# Patient Record
Sex: FEMALE | Race: WHITE | NOT HISPANIC OR LATINO | Employment: FULL TIME | URBAN - METROPOLITAN AREA
[De-identification: names, ages, dates, MRNs, and addresses within clinical notes are randomized per-mention and may not be internally consistent; named-entity substitution may affect disease eponyms.]

---

## 2017-01-13 ENCOUNTER — OFFICE VISIT (OUTPATIENT)
Dept: FAMILY MEDICINE CLINIC | Facility: CLINIC | Age: 37
End: 2017-01-13

## 2017-01-13 VITALS
HEIGHT: 68 IN | BODY MASS INDEX: 32.86 KG/M2 | OXYGEN SATURATION: 99 % | SYSTOLIC BLOOD PRESSURE: 134 MMHG | TEMPERATURE: 98.6 F | HEART RATE: 74 BPM | WEIGHT: 216.8 LBS | DIASTOLIC BLOOD PRESSURE: 70 MMHG

## 2017-01-13 DIAGNOSIS — F41.9 ANXIETY: ICD-10-CM

## 2017-01-13 DIAGNOSIS — G43.009 NONINTRACTABLE MIGRAINE, UNSPECIFIED MIGRAINE TYPE: Primary | ICD-10-CM

## 2017-01-13 PROCEDURE — 99213 OFFICE O/P EST LOW 20 MIN: CPT | Performed by: FAMILY MEDICINE

## 2017-01-13 RX ORDER — HYDROCODONE BITARTRATE AND ACETAMINOPHEN 7.5; 325 MG/1; MG/1
TABLET ORAL
Qty: 180 TABLET | Refills: 0 | Status: SHIPPED | OUTPATIENT
Start: 2017-01-13 | End: 2017-02-21 | Stop reason: SDUPTHER

## 2017-01-13 NOTE — MR AVS SNAPSHOT
Martha Diaz   1/13/2017 2:00 PM   Office Visit    Dept Phone:  [phone]   Encounter #:  [id number]    Provider:  Chava Bridges MD   Department:  [facility] AND [facility] MED[facility]INE                Your Full Care Plan              Today's Medication Changes          These changes are accurate as of: 1/13/17  3:26 PM.  If you have any questions, ask your nurse or doctor.               New Medication(s)Ordered:     sertraline 50 MG tablet   Commonly known as:  ZOLOFT   Take 1 tablet by mouth Daily.   Started by:  Chava Bridges MD            Where to Get Your Medications      These medications were sent to [facility] [address] [place] [address] [phone] [facility] [phone] [address] [address]    Hours:  24-hours Phone:  [phone]     sertraline 50 MG tablet         You can get these medications from any pharmacy     Bring a paper prescription for each of these medications     HYDROcodone-acetaminophen 7.5-325 MG per tablet                  Your Updated Medication List          This list is accurate as of: 1/13/17  3:26 PM.  Always use your most recent med list.                HYDROcodone-acetaminophen 7.5-325 MG per tablet   Commonly known as:  NORCO   TAKE 1 TAB Q 4 TO 6 HRS       promethazine 12.5 MG tablet   Commonly known as:  PHENERGAN       saccharomyces boulardii 250 MG capsule   Commonly known as:  FLORASTOR       sertraline 50 MG tablet   Commonly known as:  ZOLOFT   Take 1 tablet by mouth Daily.               You Were Diagnosed With        Codes Comments    Nonintractable migraine, unspecified migraine type    -  Primary ICD-10-CM: G43.009  ICD-9-CM: 346.10     Anxiety     ICD-10-CM: F41.9  ICD-9-CM: 300.00       Instructions     None    Patient Instructions History      Upcoming Appointments     Visit Type Date Time Department    FOLLOW UP 1/13/2017  2:00 PM NAVA VASQUEZ  "ANUJ    FOLLOW UP 2017 11:45 AM NAVA LESLIE      Fanny[employer] Signup     [facility]Bridge allows you to send messages to your doctor, view your test results, renew your prescriptions, schedule appointments, and more. To sign up, go to [employer] and click on the Sign Up Now link in the New User? box. Enter your SkyBridge Activation Code exactly as it appears below along with the last four digits of your Social Security Number and your Date of Birth () to complete the sign-up process. If you do not sign up before the expiration date, you must request a new code.    SkyBridge Activation Code: [email or web address]  Expires: 2017  5:36 AM    If you have questions, you can email [email or web address] or call [phone] to talk to our SkyBridge staff. Remember, SkyBridge is NOT to be used for urgent needs. For medical emergencies, dial 911.               Other Info from Your Visit           Your Appointments     2017 11:45 AM EDT   Follow Up with Chava Bridges MD   [facility] (--)    [address] [place] [id number] [phone]           Arrive 15 minutes prior to appointment.              Allergies     No Known Allergies      Reason for Visit     Headache 3 mo f/u-waking up with HAs    Anxiety on no meds      Vital Signs     Blood Pressure Pulse Temperature Height    134/70 (BP Location: Left arm, Patient Position: Sitting, Cuff Size: Large Adult) 74 98.6 °F (37 °C) (Oral) 67.5\" (171.5 cm)    Weight Oxygen Saturation Body Mass Index Smoking Status    216 lb 12.8 oz (98.3 kg) 99% 33.45 kg/m2 Current Some Day Smoker      Problems and Diagnoses Noted     Anxiety problem    Nonintractable migraine        "

## 2017-01-13 NOTE — PROGRESS NOTES
Subjective: Martha Diaz is a 36 y.o. female presents   here today for       Chief Complaint and HPI    I have reviewed the patient's medical history in detail and updated the computerized patient record.    Headache (3 mo f/u-waking up with HAs) and Anxiety (on no meds)      Review of Systems   Constitutional: Negative.    Eyes: Negative.    Respiratory: Negative.    Cardiovascular: Negative.    Gastrointestinal: Negative.    Endocrine: Negative.    Genitourinary: Negative.    Musculoskeletal: Negative.    Skin: Negative.    Allergic/Immunologic: Negative.    Neurological: Positive for headaches.   Hematological: Negative.    Psychiatric/Behavioral: Negative.        Physical Exam   Constitutional: She appears well-developed and well-nourished.   Cardiovascular: Normal rate, regular rhythm, normal heart sounds and intact distal pulses.    Pulmonary/Chest: Effort normal and breath sounds normal.   Vitals reviewed.      Procedures    Assessment:   Diagnosis Plan   1. Nonintractable migraine, unspecified migraine type     2. Anxiety         Plan:  No orders of the defined types were placed in this encounter.      Requested Prescriptions     Signed Prescriptions Disp Refills   • HYDROcodone-acetaminophen (NORCO) 7.5-325 MG per tablet 180 tablet 0     Sig: TAKE 1 TAB Q 4 TO 6 HRS   • sertraline (ZOLOFT) 50 MG tablet 30 tablet 3     Sig: Take 1 tablet by mouth Daily.       All tests and consults since last visit reviewed with patient    Return in about 3 months (around 4/13/2017) for Recheck.

## 2017-02-21 RX ORDER — HYDROCODONE BITARTRATE AND ACETAMINOPHEN 7.5; 325 MG/1; MG/1
TABLET ORAL
Qty: 180 TABLET | Refills: 0 | Status: SHIPPED | OUTPATIENT
Start: 2017-02-21 | End: 2017-03-21 | Stop reason: SDUPTHER

## 2017-03-21 RX ORDER — HYDROCODONE BITARTRATE AND ACETAMINOPHEN 7.5; 325 MG/1; MG/1
TABLET ORAL
Qty: 180 TABLET | Refills: 0 | Status: SHIPPED | OUTPATIENT
Start: 2017-03-21 | End: 2017-04-17 | Stop reason: SDUPTHER

## 2017-04-17 ENCOUNTER — OFFICE VISIT (OUTPATIENT)
Dept: FAMILY MEDICINE CLINIC | Facility: CLINIC | Age: 37
End: 2017-04-17

## 2017-04-17 VITALS
HEART RATE: 90 BPM | HEIGHT: 68 IN | WEIGHT: 219.4 LBS | DIASTOLIC BLOOD PRESSURE: 78 MMHG | OXYGEN SATURATION: 99 % | SYSTOLIC BLOOD PRESSURE: 118 MMHG | TEMPERATURE: 98.3 F | BODY MASS INDEX: 33.25 KG/M2

## 2017-04-17 DIAGNOSIS — F41.9 ANXIETY: ICD-10-CM

## 2017-04-17 DIAGNOSIS — G43.009 NONINTRACTABLE MIGRAINE, UNSPECIFIED MIGRAINE TYPE: Primary | ICD-10-CM

## 2017-04-17 DIAGNOSIS — L70.0 ACNE VULGARIS: ICD-10-CM

## 2017-04-17 PROCEDURE — 99213 OFFICE O/P EST LOW 20 MIN: CPT | Performed by: FAMILY MEDICINE

## 2017-04-17 RX ORDER — CLINDAMYCIN PHOSPHATE 11.9 MG/ML
SOLUTION TOPICAL 2 TIMES DAILY
Qty: 60 ML | Refills: 5 | Status: SHIPPED | OUTPATIENT
Start: 2017-04-17 | End: 2018-12-06

## 2017-04-17 RX ORDER — HYDROCODONE BITARTRATE AND ACETAMINOPHEN 7.5; 325 MG/1; MG/1
TABLET ORAL
Qty: 180 TABLET | Refills: 0 | Status: SHIPPED | OUTPATIENT
Start: 2017-04-17 | End: 2017-05-17 | Stop reason: SDUPTHER

## 2017-04-17 NOTE — PROGRESS NOTES
Chief Complaint and HPI    I have reviewed the patient's medical history in detail and updated the computerized patient record.    Subjective: Martha Diaz is a 36 y.o. female presents   here today for .    Headache (Takes prn norco); Acne (needs cleocin T); and Anxiety (not yet started  Zoloft)      Review of Systems   Constitutional: Negative for chills, fatigue, fever and unexpected weight change.   HENT: Negative for ear pain, hearing loss, sinus pressure, sore throat and tinnitus.    Eyes: Negative for pain, discharge and redness.   Respiratory: Negative for cough, shortness of breath and wheezing.    Cardiovascular: Negative for chest pain, palpitations and leg swelling.   Gastrointestinal: Negative for abdominal pain, constipation, diarrhea and nausea.   Endocrine: Negative for cold intolerance and heat intolerance.   Genitourinary: Negative for difficulty urinating, flank pain and urgency.   Musculoskeletal: Negative for back pain, joint swelling and myalgias.   Skin: Negative for rash and wound.   Allergic/Immunologic: Negative for environmental allergies and food allergies.   Neurological: Negative for dizziness, seizures, numbness and headaches.   Hematological: Negative for adenopathy. Does not bruise/bleed easily.   Psychiatric/Behavioral: Negative for decreased concentration, dysphoric mood and sleep disturbance. The patient is not nervous/anxious.    All other systems reviewed and are negative.      Physical Exam   Constitutional: She appears well-developed and well-nourished.   Cardiovascular: Normal rate, regular rhythm, normal heart sounds and intact distal pulses.    Pulmonary/Chest: Effort normal and breath sounds normal.   Neurological: She is alert. She has normal reflexes.   Skin:   nwck with acne   Vitals reviewed.      Procedures    Assessment:   Diagnosis Plan   1. Nonintractable migraine, unspecified migraine type     2. Anxiety     3. Acne vulgaris         Plan:  No orders of the  defined types were placed in this encounter.      Requested Prescriptions     Signed Prescriptions Disp Refills   • sertraline (ZOLOFT) 50 MG tablet 30 tablet 3     Sig: Take 1 tablet by mouth Daily.   • clindamycin (CLEOCIN-T) 1 % external solution 60 mL 5     Sig: Apply  topically 2 (Two) Times a Day.   • HYDROcodone-acetaminophen (NORCO) 7.5-325 MG per tablet 180 tablet 0     Sig: TAKE 1 TAB Q 4 TO 6 HRS       All tests and consults since last visit reviewed with patient    Return in about 3 months (around 7/17/2017) for Recheck.

## 2017-05-17 RX ORDER — HYDROCODONE BITARTRATE AND ACETAMINOPHEN 7.5; 325 MG/1; MG/1
TABLET ORAL
Qty: 180 TABLET | Refills: 0 | Status: SHIPPED | OUTPATIENT
Start: 2017-05-17 | End: 2017-06-13 | Stop reason: SDUPTHER

## 2017-06-13 RX ORDER — HYDROCODONE BITARTRATE AND ACETAMINOPHEN 7.5; 325 MG/1; MG/1
TABLET ORAL
Qty: 180 TABLET | Refills: 0 | Status: SHIPPED | OUTPATIENT
Start: 2017-06-13 | End: 2017-07-11 | Stop reason: SDUPTHER

## 2017-07-11 ENCOUNTER — TELEPHONE (OUTPATIENT)
Dept: FAMILY MEDICINE CLINIC | Facility: CLINIC | Age: 37
End: 2017-07-11

## 2017-07-11 RX ORDER — HYDROCODONE BITARTRATE AND ACETAMINOPHEN 7.5; 325 MG/1; MG/1
TABLET ORAL
Qty: 180 TABLET | Refills: 0 | Status: SHIPPED | OUTPATIENT
Start: 2017-07-11 | End: 2017-08-10 | Stop reason: SDUPTHER

## 2017-08-10 RX ORDER — HYDROCODONE BITARTRATE AND ACETAMINOPHEN 7.5; 325 MG/1; MG/1
TABLET ORAL
Qty: 90 TABLET | Refills: 0 | Status: SHIPPED | OUTPATIENT
Start: 2017-08-10 | End: 2017-08-23 | Stop reason: SDUPTHER

## 2017-08-10 NOTE — TELEPHONE ENCOUNTER
----- Message from Lucy Stafford sent at 8/10/2017  3:41 PM EDT -----  [phone]    REFILL ON [place]     PT AWARE THAT SHE WILL GET A CALL WHEN ITS READY AND THAT THE DR IS OUT TILL Tuesday

## 2017-08-10 NOTE — TELEPHONE ENCOUNTER
----- Message from Lucy Stafford sent at 8/10/2017  3:41 PM EDT -----  [phone]    REFILL ON NORCO     PT AWARE THAT SHE WILL GET A CALL WHEN ITS READY AND THAT THE DR IS OUT TILL Tuesday   Last office visit 4/17/17  Patient canceled 7/21/17 appt  Last fill 7/11/17  Last cara 4/17/17    Per Management two week med put up and patient called left message she must be seen in office.

## 2017-08-23 ENCOUNTER — OFFICE VISIT (OUTPATIENT)
Dept: FAMILY MEDICINE CLINIC | Facility: CLINIC | Age: 37
End: 2017-08-23

## 2017-08-23 VITALS
HEIGHT: 68 IN | OXYGEN SATURATION: 100 % | SYSTOLIC BLOOD PRESSURE: 120 MMHG | BODY MASS INDEX: 33.65 KG/M2 | DIASTOLIC BLOOD PRESSURE: 90 MMHG | TEMPERATURE: 98.7 F | WEIGHT: 222 LBS | HEART RATE: 67 BPM

## 2017-08-23 DIAGNOSIS — G43.009 NONINTRACTABLE MIGRAINE, UNSPECIFIED MIGRAINE TYPE: Primary | ICD-10-CM

## 2017-08-23 DIAGNOSIS — Z79.899 ENCOUNTER FOR LONG-TERM (CURRENT) USE OF MEDICATIONS: ICD-10-CM

## 2017-08-23 DIAGNOSIS — F41.9 ANXIETY: ICD-10-CM

## 2017-08-23 LAB
POC AMPHETAMINES: NEGATIVE
POC BARBITURATES: NEGATIVE
POC BENZODIAZEPHINES: NEGATIVE
POC COCAINE: NEGATIVE
POC METHADONE: NEGATIVE
POC METHAMPHETAMINE SCREEN URINE: NEGATIVE
POC OPIATES: NEGATIVE
POC OXYCODONE: POSITIVE
POC PHENCYCLIDINE: NEGATIVE
POC PROPOXYPHENE: NEGATIVE
POC THC: NEGATIVE
POC TRICYCLIC ANTIDEPRESSANTS: NEGATIVE

## 2017-08-23 PROCEDURE — 80305 DRUG TEST PRSMV DIR OPT OBS: CPT | Performed by: NURSE PRACTITIONER

## 2017-08-23 PROCEDURE — 99213 OFFICE O/P EST LOW 20 MIN: CPT | Performed by: NURSE PRACTITIONER

## 2017-08-23 RX ORDER — FLUOXETINE HYDROCHLORIDE 20 MG/1
20 CAPSULE ORAL DAILY
Qty: 30 CAPSULE | Refills: 3 | Status: SHIPPED | OUTPATIENT
Start: 2017-08-23 | End: 2018-12-06

## 2017-08-23 RX ORDER — HYDROCODONE BITARTRATE AND ACETAMINOPHEN 7.5; 325 MG/1; MG/1
TABLET ORAL
Qty: 180 TABLET | Refills: 0 | Status: SHIPPED | OUTPATIENT
Start: 2017-08-23 | End: 2017-09-21 | Stop reason: SDUPTHER

## 2017-08-23 RX ORDER — FLUOXETINE 10 MG/1
CAPSULE ORAL
Qty: 60 CAPSULE | Refills: 0 | Status: SHIPPED | OUTPATIENT
Start: 2017-08-23 | End: 2018-12-06

## 2017-08-23 NOTE — PROGRESS NOTES
Subjective   Martha Diaz is a 36 y.o. female presents for medication refill for Norco. Takes for migraines. Also started Zoloft for increased anxiety and depression. Unable to tolerate the side effects and no longer taking. Would like to discuss alternatives to zoloft to help reduce her symptoms.     Headache    This is a chronic problem. The current episode started more than 1 year ago. The problem occurs intermittently. The problem has been waxing and waning. The pain is at a severity of 6/10. Pertinent negatives include no abdominal pain, abnormal behavior, anorexia, back pain, blurred vision, coughing, dizziness, drainage, ear pain, eye pain, eye redness, eye watering, facial sweating, fever, hearing loss, insomnia, loss of balance, muscle aches, nausea, neck pain, numbness, phonophobia, photophobia, rhinorrhea, scalp tenderness, seizures, sinus pressure, sore throat, swollen glands, tingling, tinnitus, visual change, vomiting, weakness or weight loss. Nothing aggravates the symptoms.        The following portions of the patient's history were reviewed and updated as appropriate: allergies, current medications, past family history, past medical history, past social history, past surgical history and problem list.    Review of Systems   Constitutional: Negative.  Negative for fever and weight loss.   HENT: Negative for ear pain, hearing loss, rhinorrhea, sinus pressure, sore throat and tinnitus.    Eyes: Negative.  Negative for blurred vision, photophobia, pain and redness.   Respiratory: Negative.  Negative for cough.    Cardiovascular: Negative.    Gastrointestinal: Negative.  Negative for abdominal pain, anorexia, nausea and vomiting.   Endocrine: Negative.    Genitourinary: Negative.    Musculoskeletal: Negative.  Negative for back pain and neck pain.   Skin: Negative.    Allergic/Immunologic: Negative.    Neurological: Positive for headaches. Negative for dizziness, tingling, seizures, weakness, numbness  and loss of balance.   Hematological: Negative.    Psychiatric/Behavioral: Negative.  The patient does not have insomnia.        Objective   Physical Exam   Constitutional: She appears well-developed and well-nourished.   HENT:   Head: Normocephalic.   Eyes: EOM are normal. Pupils are equal, round, and reactive to light.   Neck: Neck supple.   Cardiovascular: Normal rate, regular rhythm and normal heart sounds.  Exam reveals no gallop and no friction rub.    No murmur heard.  Pulmonary/Chest: Effort normal and breath sounds normal. No respiratory distress. She has no wheezes. She has no rales.   Skin: Skin is warm and dry.   Psychiatric: She has a normal mood and affect.   Vitals reviewed.      Assessment/Plan   Martha was seen today for follow-up.    Diagnoses and all orders for this visit:    Nonintractable migraine, unspecified migraine type    Anxiety    Encounter for long-term (current) use of medications  -     POC Urine Drug Screen, Triage    Other orders  -     FLUoxetine (PROZAC) 10 MG capsule; Take one capsule once daily x 7 days, then increase to two capsules daily  -     FLUoxetine (PROZAC) 20 MG capsule; Take 1 capsule by mouth Daily.

## 2017-08-23 NOTE — PATIENT INSTRUCTIONS
Acetaminophen; Hydrocodone tablets or capsules  What is this medicine?  ACETAMINOPHEN; HYDROCODONE (a set a TONO javier fen; lynn droe KOE done) is a pain reliever. It is used to treat moderate to severe pain.  This medicine may be used for other purposes; ask your health care provider or pharmacist if you have questions.  COMMON BRAND NAME(S): Anexsia, Bancap HC, Ceta-Plus, Co-Gesic, Comfortpak, Dolagesic, Dolorex Forte, DuoCet, Hydrocet, Hydrogesic, Lorcet, Lorcet HD, Lorcet Plus, Lortab, Margesic H, Maxidone, Norco, Polygesic, Stagesic, Vanacet, Verdrocet, Vicodin, Vicodin ES, Vicodin HP, Xodol, Zydone  What should I tell my health care provider before I take this medicine?  They need to know if you have any of these conditions:  -brain tumor  -Crohn's disease, inflammatory bowel disease, or ulcerative colitis  -drug abuse or addiction  -head injury  -heart or circulation problems  -if you often drink alcohol  -kidney disease or problems going to the bathroom  -liver disease  -lung disease, asthma, or breathing problems  -an unusual or allergic reaction to acetaminophen, hydrocodone, other opioid analgesics, other medicines, foods, dyes, or preservatives  -pregnant or trying to get pregnant  -breast-feeding  How should I use this medicine?  Take this medicine by mouth with a glass of water. Follow the directions on the prescription label. You can take it with or without food. If it upsets your stomach, take it with food. Do not take your medicine more often than directed.  A special MedGuide will be given to you by the pharmacist with each prescription and refill. Be sure to read this information carefully each time.  Talk to your pediatrician regarding the use of this medicine in children. Special care may be needed.  Overdosage: If you think you have taken too much of this medicine contact a poison control center or emergency room at once.  NOTE: This medicine is only for you. Do not share this medicine with  others.  What if I miss a dose?  If you miss a dose, take it as soon as you can. If it is almost time for your next dose, take only that dose. Do not take double or extra doses.  What may interact with this medicine?  This medicine may interact with the following medications:  -alcohol  -antiviral medicines for HIV or AIDS  -atropine  -antihistamines for allergy, cough and cold  -certain antibiotics like erythromycin, clarithromycin  -certain medicines for anxiety or sleep  -certain medicines for bladder problems like oxybutynin, tolterodine  -certain medicines for depression like amitriptyline, fluoxetine, sertraline  -certain medicines for fungal infections like ketoconazole and itraconazole  -certain medicines for Parkinson's disease like benztropine, trihexyphenidyl  -certain medicines for seizures like carbamazepine, phenobarbital, phenytoin, primidone  -certain medicines for stomach problems like dicyclomine, hyoscyamine  -certain medicines for travel sickness like scopolamine  -general anesthetics like halothane, isoflurane, methoxyflurane, propofol  -ipratropium  -local anesthetics like lidocaine, pramoxine, tetracaine  -MAOIs like Carbex, Eldepryl, Marplan, Nardil, and Parnate  -medicines that relax muscles for surgery  -other medicines with acetaminophen  -other narcotic medicines for pain or cough  -phenothiazines like chlorpromazine, mesoridazine, prochlorperazine, thioridazine  -rifampin  This list may not describe all possible interactions. Give your health care provider a list of all the medicines, herbs, non-prescription drugs, or dietary supplements you use. Also tell them if you smoke, drink alcohol, or use illegal drugs. Some items may interact with your medicine.  What should I watch for while using this medicine?  Tell your doctor or health care professional if your pain does not go away, if it gets worse, or if you have new or a different type of pain. You may develop tolerance to the medicine.  Tolerance means that you will need a higher dose of the medicine for pain relief. Tolerance is normal and is expected if you take the medicine for a long time.  Do not suddenly stop taking your medicine because you may develop a severe reaction. Your body becomes used to the medicine. This does NOT mean you are addicted. Addiction is a behavior related to getting and using a drug for a non-medical reason. If you have pain, you have a medical reason to take pain medicine. Your doctor will tell you how much medicine to take. If your doctor wants you to stop the medicine, the dose will be slowly lowered over time to avoid any side effects.  There are different types of narcotic medicines (opiates). If you take more than one type at the same time or if you are taking another medicine that also causes drowsiness, you may have more side effects. Give your health care provider a list of all medicines you use. Your doctor will tell you how much medicine to take. Do not take more medicine than directed. Call emergency for help if you have problems breathing or unusual sleepiness.  Do not take other medicines that contain acetaminophen with this medicine. Always read labels carefully. If you have questions, ask your doctor or pharmacist.  If you take too much acetaminophen get medical help right away. Too much acetaminophen can be very dangerous and cause liver damage. Even if you do not have symptoms, it is important to get help right away.  You may get drowsy or dizzy. Do not drive, use machinery, or do anything that needs mental alertness until you know how this medicine affects you. Do not stand or sit up quickly, especially if you are an older patient. This reduces the risk of dizzy or fainting spells. Alcohol may interfere with the effect of this medicine. Avoid alcoholic drinks.  The medicine will cause constipation. Try to have a bowel movement at least every 2 to 3 days. If you do not have a bowel movement for 3  days, call your doctor or health care professional.  Your mouth may get dry. Chewing sugarless gum or sucking hard candy, and drinking plenty of water may help. Contact your doctor if the problem does not go away or is severe.  What side effects may I notice from receiving this medicine?  Side effects that you should report to your doctor or health care professional as soon as possible:  -allergic reactions like skin rash, itching or hives, swelling of the face, lips, or tongue  -breathing problems  -confusion  -redness, blistering, peeling or loosening of the skin, including inside the mouth  -signs and symptoms of low blood pressure like dizziness; feeling faint or lightheaded, falls; unusually weak or tired  -trouble passing urine or change in the amount of urine  -yellowing of the eyes or skin  Side effects that usually do not require medical attention (report to your doctor or health care professional if they continue or are bothersome):  -constipation  -dry mouth  -nausea, vomiting  -tiredness  This list may not describe all possible side effects. Call your doctor for medical advice about side effects. You may report side effects to FDA at 9-988-FDA-6377.  Where should I keep my medicine?  Keep out of the reach of children. This medicine can be abused. Keep your medicine in a safe place to protect it from theft. Do not share this medicine with anyone. Selling or giving away this medicine is dangerous and against the law.  This medicine may cause accidental overdose and death if it taken by other adults, children, or pets. Mix any unused medicine with a substance like cat litter or coffee grounds. Then throw the medicine away in a sealed container like a sealed bag or a coffee can with a lid. Do not use the medicine after the expiration date.  Store at room temperature between 15 and 30 degrees C (59 and 86 degrees F).  NOTE: This sheet is a summary. It may not cover all possible information. If you have  questions about this medicine, talk to your doctor, pharmacist, or health care provider.     © 2017, [employer]. (2016-09-09 10:02:16)

## 2017-09-21 NOTE — TELEPHONE ENCOUNTER
----- Message from Torres Chacko sent at 9/21/2017 10:39 AM EDT -----  [phone]    PT WOULD LIKE A REFILL ON HER SCRIPT:   - HYDROcodone-acetaminophen (NORCO) 7.5-325 MG per tablet     LSD: 8.23.17    PT IS AWARE SHE WILL GET A CALL WHEN SCRIPT IS READY.   THANK YOU     Last fill 8/23/17  Gui marroquin 8/23/17  No scheduled appt

## 2017-09-25 RX ORDER — HYDROCODONE BITARTRATE AND ACETAMINOPHEN 7.5; 325 MG/1; MG/1
TABLET ORAL
Qty: 180 TABLET | Refills: 0 | Status: SHIPPED | OUTPATIENT
Start: 2017-09-25 | End: 2017-10-23 | Stop reason: SDUPTHER

## 2017-10-23 RX ORDER — HYDROCODONE BITARTRATE AND ACETAMINOPHEN 7.5; 325 MG/1; MG/1
TABLET ORAL
Qty: 180 TABLET | Refills: 0 | Status: SHIPPED | OUTPATIENT
Start: 2017-10-23 | End: 2017-11-10 | Stop reason: SDUPTHER

## 2017-11-10 RX ORDER — HYDROCODONE BITARTRATE AND ACETAMINOPHEN 7.5; 325 MG/1; MG/1
TABLET ORAL
Qty: 180 TABLET | Refills: 0 | Status: SHIPPED | OUTPATIENT
Start: 2017-11-10 | End: 2017-12-21 | Stop reason: SDUPTHER

## 2017-12-26 RX ORDER — HYDROCODONE BITARTRATE AND ACETAMINOPHEN 7.5; 325 MG/1; MG/1
TABLET ORAL
Qty: 180 TABLET | Refills: 0 | Status: SHIPPED | OUTPATIENT
Start: 2017-12-26 | End: 2018-01-22 | Stop reason: SDUPTHER

## 2018-01-23 RX ORDER — HYDROCODONE BITARTRATE AND ACETAMINOPHEN 7.5; 325 MG/1; MG/1
TABLET ORAL
Qty: 180 TABLET | Refills: 0 | Status: SHIPPED | OUTPATIENT
Start: 2018-01-23 | End: 2018-02-20 | Stop reason: SDUPTHER

## 2018-02-20 ENCOUNTER — OFFICE VISIT (OUTPATIENT)
Dept: INTERNAL MEDICINE | Facility: CLINIC | Age: 38
End: 2018-02-20

## 2018-02-20 VITALS
DIASTOLIC BLOOD PRESSURE: 88 MMHG | HEART RATE: 73 BPM | SYSTOLIC BLOOD PRESSURE: 118 MMHG | RESPIRATION RATE: 16 BRPM | BODY MASS INDEX: 34.53 KG/M2 | OXYGEN SATURATION: 98 % | HEIGHT: 68 IN | TEMPERATURE: 98.2 F | WEIGHT: 227.8 LBS

## 2018-02-20 DIAGNOSIS — R35.0 FREQUENT URINATION: Primary | ICD-10-CM

## 2018-02-20 DIAGNOSIS — L70.0 ACNE VULGARIS: ICD-10-CM

## 2018-02-20 DIAGNOSIS — G44.039 EPISODIC PAROXYSMAL HEMICRANIA, NOT INTRACTABLE: ICD-10-CM

## 2018-02-20 PROBLEM — N30.00 ACUTE CYSTITIS: Status: ACTIVE | Noted: 2018-02-20

## 2018-02-20 LAB
BILIRUB BLD-MCNC: NEGATIVE MG/DL
CLARITY, POC: CLEAR
COLOR UR: YELLOW
GLUCOSE UR STRIP-MCNC: NEGATIVE MG/DL
KETONES UR QL: NEGATIVE
LEUKOCYTE EST, POC: NEGATIVE
NITRITE UR-MCNC: NEGATIVE MG/ML
PH UR: 5 [PH] (ref 5–8)
PROT UR STRIP-MCNC: NEGATIVE MG/DL
RBC # UR STRIP: NEGATIVE /UL
SP GR UR: 1.01 (ref 1–1.03)
UROBILINOGEN UR QL: NORMAL

## 2018-02-20 PROCEDURE — 99213 OFFICE O/P EST LOW 20 MIN: CPT | Performed by: FAMILY MEDICINE

## 2018-02-20 RX ORDER — PROPRANOLOL HCL 60 MG
60 CAPSULE, EXTENDED RELEASE 24HR ORAL DAILY
Qty: 30 CAPSULE | Refills: 5 | Status: SHIPPED | OUTPATIENT
Start: 2018-02-20 | End: 2018-10-08 | Stop reason: SDUPTHER

## 2018-02-20 RX ORDER — DOXYCYCLINE HYCLATE 100 MG/1
100 CAPSULE ORAL DAILY
Qty: 30 CAPSULE | Refills: 5 | Status: SHIPPED | OUTPATIENT
Start: 2018-02-20 | End: 2018-04-03

## 2018-02-20 RX ORDER — HYDROCODONE BITARTRATE AND ACETAMINOPHEN 7.5; 325 MG/1; MG/1
TABLET ORAL
Qty: 180 TABLET | Refills: 0 | Status: SHIPPED | OUTPATIENT
Start: 2018-02-20 | End: 2018-03-19 | Stop reason: SDUPTHER

## 2018-02-20 RX ORDER — SULFAMETHOXAZOLE AND TRIMETHOPRIM 800; 160 MG/1; MG/1
1 TABLET ORAL 2 TIMES DAILY
Qty: 20 TABLET | Refills: 0 | Status: SHIPPED | OUTPATIENT
Start: 2018-02-20 | End: 2018-12-06

## 2018-02-20 NOTE — PROGRESS NOTES
"CC:UTI sxs, Migraines    Subjective.../HPI  Patient present today with1) migraines has HA daily, worse with ndecresed exercise2) acne  3)mood -off Prozac  4) UTI burning  I have reviewed the patient's medical history in detail and updated the computerized patient record.        Family History   Problem Relation Age of Onset   • Breast cancer Maternal Aunt    • Stomach cancer Maternal Aunt    • Hypertension Maternal Grandmother    • Thyroid disease Maternal Grandmother    • Hypertension Maternal Grandfather    • Thyroid disease Maternal Grandfather        Social History     Social History   • Marital status: Single     Spouse name: N/A   • Number of children: N/A   • Years of education: N/A     Occupational History   • Not on file.     Social History Main Topics   • Smoking status: Current Some Day Smoker     Packs/day: 1.00     Years: 17.00     Types: Cigarettes   • Smokeless tobacco: Never Used   • Alcohol use Yes      Comment: social   • Drug use: No   • Sexual activity: Defer     Other Topics Concern   • Not on file     Social History Narrative   • No narrative on file       Review of Systems:   Review of Systems   Constitutional: Negative.    HENT: Negative.    Eyes: Negative.    Respiratory: Negative.    Cardiovascular: Negative.    Gastrointestinal: Negative.    Endocrine: Negative.    Genitourinary: Negative.    Musculoskeletal: Negative.    Skin: Negative.    Allergic/Immunologic: Negative.    Neurological: Negative.    Hematological: Negative.    Psychiatric/Behavioral: Negative.          Physical Exam   Constitutional: She appears well-developed and well-nourished.   Cardiovascular: Normal rate, regular rhythm, normal heart sounds and intact distal pulses.    Pulmonary/Chest: Effort normal and breath sounds normal.   Vitals reviewed.        Vital Signs     Vitals:    02/20/18 1615   Weight: 103 kg (227 lb 12.8 oz)   Height: 171.5 cm (67.5\")          Results Review:      REVIEWED AND DISCUSSED CLINICAL " RESULTS WITH PATIENT      Requested Prescriptions     Signed Prescriptions Disp Refills   • doxycycline (VIBRAMYCIN) 100 MG capsule 30 capsule 5     Sig: Take 1 capsule by mouth Daily.   • propranolol LA (INDERAL LA) 60 MG 24 hr capsule 30 capsule 5     Sig: Take 1 capsule by mouth Daily.   • sulfamethoxazole-trimethoprim (BACTRIM DS) 800-160 MG per tablet 20 tablet 0     Sig: Take 1 tablet by mouth 2 (Two) Times a Day.   • HYDROcodone-acetaminophen (NORCO) 7.5-325 MG per tablet 180 tablet 0     Sig: TAKE 1 TAB Q 4 TO 6 HRS         Current Outpatient Prescriptions:   •  clindamycin (CLEOCIN-T) 1 % external solution, Apply  topically 2 (Two) Times a Day., Disp: 60 mL, Rfl: 5  •  doxycycline (VIBRAMYCIN) 100 MG capsule, Take 1 capsule by mouth Daily., Disp: 30 capsule, Rfl: 5  •  FLUoxetine (PROZAC) 10 MG capsule, Take one capsule once daily x 7 days, then increase to two capsules daily, Disp: 60 capsule, Rfl: 0  •  FLUoxetine (PROZAC) 20 MG capsule, Take 1 capsule by mouth Daily., Disp: 30 capsule, Rfl: 3  •  HYDROcodone-acetaminophen (NORCO) 7.5-325 MG per tablet, TAKE 1 TAB Q 4 TO 6 HRS, Disp: 180 tablet, Rfl: 0  •  propranolol LA (INDERAL LA) 60 MG 24 hr capsule, Take 1 capsule by mouth Daily., Disp: 30 capsule, Rfl: 5  •  sulfamethoxazole-trimethoprim (BACTRIM DS) 800-160 MG per tablet, Take 1 tablet by mouth 2 (Two) Times a Day., Disp: 20 tablet, Rfl: 0    Procedures    Assessment/Plan     Diagnoses and all orders for this visit:    Frequent urination  -     POC Urinalysis Dipstick, Automated  -     sulfamethoxazole-trimethoprim (BACTRIM DS) 800-160 MG per tablet; Take 1 tablet by mouth 2 (Two) Times a Day.  -     Urine Culture - Urine, Urine, Clean Catch    Episodic paroxysmal hemicrania, not intractable  -     propranolol LA (INDERAL LA) 60 MG 24 hr capsule; Take 1 capsule by mouth Daily.  -     HYDROcodone-acetaminophen (NORCO) 7.5-325 MG per tablet; TAKE 1 TAB Q 4 TO 6 HRS    Acne vulgaris  -     doxycycline  (VIBRAMYCIN) 100 MG capsule; Take 1 capsule by mouth Daily.  -     Ambulatory Referral to Dermatology         Return in about 2 months (around 4/20/2018) for Recheck.    Chava Bridges M.D  02/20/18  4:56 PM

## 2018-02-22 LAB
BACTERIA UR CULT: ABNORMAL
BACTERIA UR CULT: ABNORMAL

## 2018-03-19 DIAGNOSIS — G44.039 EPISODIC PAROXYSMAL HEMICRANIA, NOT INTRACTABLE: ICD-10-CM

## 2018-03-20 RX ORDER — HYDROCODONE BITARTRATE AND ACETAMINOPHEN 7.5; 325 MG/1; MG/1
TABLET ORAL
Qty: 180 TABLET | Refills: 0 | Status: SHIPPED | OUTPATIENT
Start: 2018-03-20 | End: 2018-04-03 | Stop reason: SDUPTHER

## 2018-04-03 ENCOUNTER — OFFICE VISIT (OUTPATIENT)
Dept: INTERNAL MEDICINE | Facility: CLINIC | Age: 38
End: 2018-04-03

## 2018-04-03 VITALS
HEIGHT: 68 IN | OXYGEN SATURATION: 99 % | WEIGHT: 228.4 LBS | SYSTOLIC BLOOD PRESSURE: 119 MMHG | HEART RATE: 73 BPM | DIASTOLIC BLOOD PRESSURE: 80 MMHG | BODY MASS INDEX: 34.62 KG/M2 | TEMPERATURE: 98.6 F

## 2018-04-03 DIAGNOSIS — N30.00 ACUTE CYSTITIS WITHOUT HEMATURIA: ICD-10-CM

## 2018-04-03 DIAGNOSIS — G44.039 EPISODIC PAROXYSMAL HEMICRANIA, NOT INTRACTABLE: ICD-10-CM

## 2018-04-03 DIAGNOSIS — Z71.6 TOBACCO ABUSE COUNSELING: ICD-10-CM

## 2018-04-03 DIAGNOSIS — F41.9 ANXIETY: Primary | ICD-10-CM

## 2018-04-03 PROCEDURE — 99213 OFFICE O/P EST LOW 20 MIN: CPT | Performed by: FAMILY MEDICINE

## 2018-04-03 RX ORDER — NICOTINE 21 MG/24HR
1 PATCH, TRANSDERMAL 24 HOURS TRANSDERMAL EVERY 24 HOURS
Qty: 14 PATCH | Refills: 0 | Status: SHIPPED | OUTPATIENT
Start: 2018-04-03 | End: 2018-07-12 | Stop reason: SDUPTHER

## 2018-04-03 RX ORDER — HYDROCODONE BITARTRATE AND ACETAMINOPHEN 7.5; 325 MG/1; MG/1
TABLET ORAL
Qty: 180 TABLET | Refills: 0 | Status: SHIPPED | OUTPATIENT
Start: 2018-04-03 | End: 2018-05-15 | Stop reason: SDUPTHER

## 2018-04-03 NOTE — PROGRESS NOTES
CC:tobacco abuse 2) depression 3) uti    Subjective.../HPI  Patient present today with    I have reviewed the patient's medical history in detail and updated the computerized patient record.    Past Medical History:   Diagnosis Date   • Anxiety    • Bump     on the wrist   • Ganglion cyst of wrist     R wrist   • Headache    • Migraine    • Right wrist pain    • Stress        Past Surgical History:   Procedure Laterality Date   •  SECTION     • CHOLECYSTECTOMY         Family History   Problem Relation Age of Onset   • Breast cancer Maternal Aunt    • Stomach cancer Maternal Aunt    • Hypertension Maternal Grandmother    • Thyroid disease Maternal Grandmother    • Hypertension Maternal Grandfather    • Thyroid disease Maternal Grandfather        Social History     Social History   • Marital status: Single     Spouse name: N/A   • Number of children: N/A   • Years of education: N/A     Occupational History   • Not on file.     Social History Main Topics   • Smoking status: Current Some Day Smoker     Packs/day: 1.00     Years: 17.00     Types: Cigarettes   • Smokeless tobacco: Never Used   • Alcohol use Yes      Comment: social   • Drug use: No   • Sexual activity: Defer     Other Topics Concern   • Not on file     Social History Narrative   • No narrative on file       Most Recent Immunizations   Administered Date(s) Administered   • Influenza, Quadrivalent 2016       Review of Systems:   Review of Systems   Constitutional: Negative.    Eyes: Negative.    Respiratory: Positive for cough and stridor.    Cardiovascular: Negative.    Gastrointestinal: Negative.    Endocrine: Negative.    Genitourinary: Negative.    Musculoskeletal: Negative.    Skin: Negative.    Allergic/Immunologic: Negative.    Neurological: Negative.    Hematological: Negative.    Psychiatric/Behavioral: Negative.          Physical Exam   Constitutional: She is oriented to person, place, and time. She appears well-developed and  "well-nourished.   Cardiovascular: Normal rate, regular rhythm and normal heart sounds.    Pulmonary/Chest: She has wheezes. She has rales.   Neurological: She is alert and oriented to person, place, and time.   Psychiatric: She has a normal mood and affect. Her behavior is normal.   Vitals reviewed.        Vital Signs     Vitals:    04/03/18 1251   BP: 119/80   BP Location: Left arm   Patient Position: Sitting   Cuff Size: Large Adult   Pulse: 73   Temp: 98.6 °F (37 °C)   TempSrc: Oral   SpO2: 99%   Weight: 104 kg (228 lb 6.4 oz)   Height: 171.5 cm (67.52\")          Results Review:      REVIEWED AND DISCUSSED CLINICAL RESULTS WITH PATIENT      Requested Prescriptions      No prescriptions requested or ordered in this encounter         Current Outpatient Prescriptions:   •  clindamycin (CLEOCIN-T) 1 % external solution, Apply  topically 2 (Two) Times a Day., Disp: 60 mL, Rfl: 5  •  FLUoxetine (PROZAC) 10 MG capsule, Take one capsule once daily x 7 days, then increase to two capsules daily, Disp: 60 capsule, Rfl: 0  •  FLUoxetine (PROZAC) 20 MG capsule, Take 1 capsule by mouth Daily., Disp: 30 capsule, Rfl: 3  •  HYDROcodone-acetaminophen (NORCO) 7.5-325 MG per tablet, TAKE 1 TAB Q 4 TO 6 HRS, Disp: 180 tablet, Rfl: 0  •  propranolol LA (INDERAL LA) 60 MG 24 hr capsule, Take 1 capsule by mouth Daily., Disp: 30 capsule, Rfl: 5  •  sulfamethoxazole-trimethoprim (BACTRIM DS) 800-160 MG per tablet, Take 1 tablet by mouth 2 (Two) Times a Day., Disp: 20 tablet, Rfl: 0    Procedures          There are no diagnoses linked to this encounter.     No Follow-up on file.    Chava Bridges M.D  04/03/18  1:16 PM          "

## 2018-05-08 ENCOUNTER — TELEPHONE (OUTPATIENT)
Dept: INTERNAL MEDICINE | Facility: CLINIC | Age: 38
End: 2018-05-08

## 2018-05-08 RX ORDER — AZELASTINE 1 MG/ML
2 SPRAY, METERED NASAL DAILY
Qty: 1 EACH | Refills: 12 | Status: SHIPPED | OUTPATIENT
Start: 2018-05-08 | End: 2018-12-06

## 2018-05-10 ENCOUNTER — HOSPITAL ENCOUNTER (EMERGENCY)
Facility: HOSPITAL | Age: 38
Discharge: HOME | End: 2018-05-10
Attending: EMERGENCY MEDICINE | Admitting: EMERGENCY MEDICINE

## 2018-05-10 VITALS
SYSTOLIC BLOOD PRESSURE: 146 MMHG | TEMPERATURE: 97.7 F | OXYGEN SATURATION: 100 % | HEIGHT: 67 IN | HEART RATE: 94 BPM | WEIGHT: 220 LBS | DIASTOLIC BLOOD PRESSURE: 96 MMHG | RESPIRATION RATE: 16 BRPM | BODY MASS INDEX: 34.53 KG/M2

## 2018-05-10 DIAGNOSIS — J01.80 ACUTE NON-RECURRENT SINUSITIS OF OTHER SINUS: Primary | ICD-10-CM

## 2018-05-10 PROCEDURE — 99282 EMERGENCY DEPT VISIT SF MDM: CPT

## 2018-05-10 PROCEDURE — 25010000002 DEXAMETHASONE SODIUM PHOSPHATE 20 MG/5ML SOLUTION: Performed by: EMERGENCY MEDICINE

## 2018-05-10 PROCEDURE — 96372 THER/PROPH/DIAG INJ SC/IM: CPT

## 2018-05-10 RX ORDER — DEXAMETHASONE SODIUM PHOSPHATE 4 MG/ML
10 INJECTION, SOLUTION INTRA-ARTICULAR; INTRALESIONAL; INTRAMUSCULAR; INTRAVENOUS; SOFT TISSUE ONCE
Status: COMPLETED | OUTPATIENT
Start: 2018-05-10 | End: 2018-05-10

## 2018-05-10 RX ORDER — MINOCYCLINE HYDROCHLORIDE 75 MG/1
75 CAPSULE ORAL 2 TIMES DAILY
COMMUNITY
End: 2018-12-06

## 2018-05-10 RX ADMIN — DEXAMETHASONE SODIUM PHOSPHATE 10 MG: 4 INJECTION, SOLUTION INTRAMUSCULAR; INTRAVENOUS at 17:26

## 2018-05-10 NOTE — ED PROVIDER NOTES
EMERGENCY DEPARTMENT ENCOUNTER    CHIEF COMPLAINT  Chief Complaint: Headache  History given by: patient  History limited by: nothing  Room Number: 53/53  PMD: Chava Bridges MD      HPI:  Pt is a 37 y.o. female who presents with an intermittent, frontal HA for the last 6 days. Pt also complains of sinus pressure, sinus congestion, post-nasal drip, photophobia and bilateral eye pressure. Pt states that she has taken Allegra and Mucinex without relief and Astelin with mild relief.  Pt states that she has a history of migraines but that she has not had facial pressure previously.    Duration - 6 days  Onset - gradual  Timing - intermittent  Location of Headache - frontal  Radiation - without radiation  Description of Headache - dull pain  Intensity/Severity - moderate  Progression - unchanged  Associated Symptoms - photophobia, post-nasal drip, sinus congestion, sinus pressure, bilateral eye pressure  Aura -None  Aggravating Factors- No increased intracranial pressure symptoms  Alleviating Factors - unable to obtain relief with OTC meds  History of Headaches- Pt states that she has a history of migraines but that she has not had facial pressure previously  Treatment Prior to Arrival -  Pt states that she has taken Allegra and Mucinex without relief and Astelin with mild relief.  Pt states that she has a history of migraines but that she has not had facial pressure previously.      PAST MEDICAL HISTORY  Active Ambulatory Problems     Diagnosis Date Noted   • Headache 06/22/2016   • Ganglion cyst of wrist 06/22/2016   • Nonintractable migraine 09/27/2016   • Anxiety 01/13/2017   • Acne vulgaris 04/17/2017   • Acute cystitis 02/20/2018   • Tobacco abuse counseling 04/03/2018     Resolved Ambulatory Problems     Diagnosis Date Noted   • No Resolved Ambulatory Problems     Past Medical History:   Diagnosis Date   • Anxiety    • Bump    • Ganglion cyst of wrist    • Headache    • Migraine    • Right wrist pain    •  Stress        PAST SURGICAL HISTORY  Past Surgical History:   Procedure Laterality Date   •  SECTION  2004   • CHOLECYSTECTOMY         FAMILY HISTORY  Family History   Problem Relation Age of Onset   • Breast cancer Maternal Aunt    • Stomach cancer Maternal Aunt    • Hypertension Maternal Grandmother    • Thyroid disease Maternal Grandmother    • Hypertension Maternal Grandfather    • Thyroid disease Maternal Grandfather        SOCIAL HISTORY  Social History     Social History   • Marital status: Single     Spouse name: N/A   • Number of children: N/A   • Years of education: N/A     Occupational History   • Not on file.     Social History Main Topics   • Smoking status: Former Smoker     Packs/day: 1.00     Years: 17.00     Types: Cigarettes   • Smokeless tobacco: Never Used   • Alcohol use Yes      Comment: social   • Drug use: No   • Sexual activity: Defer     Other Topics Concern   • Not on file     Social History Narrative   • No narrative on file       ALLERGIES  Zoloft [sertraline hcl]    REVIEW OF SYSTEMS  Review of Systems   Constitutional: Negative for fever.   HENT: Positive for congestion (sinus), postnasal drip and sinus pressure. Negative for sore throat.    Eyes: Positive for photophobia and pain (bilateral eye pressure).   Respiratory: Negative for cough and shortness of breath.    Cardiovascular: Negative for chest pain.   Gastrointestinal: Negative for abdominal pain, diarrhea and vomiting.   Genitourinary: Negative for dysuria.   Musculoskeletal: Negative for neck pain.   Skin: Negative for rash.   Allergic/Immunologic: Negative.    Neurological: Positive for headaches. Negative for weakness and numbness.   Hematological: Negative.    Psychiatric/Behavioral: Negative.    All other systems reviewed and are negative.      PHYSICAL EXAM  ED Triage Vitals   Temp Heart Rate Resp BP SpO2   05/10/18 1528 05/10/18 1525 05/10/18 1525 05/10/18 1528 05/10/18 1525   97.7 °F (36.5 °C) 117 16 138/90  100 %      Temp src Heart Rate Source Patient Position BP Location FiO2 (%)   -- 05/10/18 1525 -- -- --    Monitor          Physical Exam   Constitutional: She is oriented to person, place, and time and well-developed, well-nourished, and in no distress. No distress.   HENT:   Head: Normocephalic and atraumatic.   Left Ear: Tympanic membrane normal.   Nose: Sinus tenderness (maxillary and frontal) present.   Boggy nasal mucosa. Purulent post-nasal drip. Serous effusions of bilateral ears   Eyes: EOM are normal. Pupils are equal, round, and reactive to light.   Neck: Normal range of motion. Neck supple.   Cardiovascular: Normal rate, regular rhythm and normal heart sounds.    Pulmonary/Chest: Effort normal and breath sounds normal. No respiratory distress.   Abdominal: Soft. There is no tenderness. There is no rebound and no guarding.   Musculoskeletal: Normal range of motion. She exhibits no edema.   Neurological: She is alert and oriented to person, place, and time. She has normal sensation and normal strength.   Skin: Skin is warm and dry. No rash noted.   Psychiatric: Mood and affect normal.   Nursing note and vitals reviewed.      PROCEDURES  Procedures      PROGRESS AND CONSULTS  ED Course     1709- Notified pt that her symptoms are c/w acute sinusitis and that she is already being properly treated on her abx. Discussed the plan to discharge the pt home with a prescription for steroids. Pt understands and agrees with the plan, all questions answered.        MEDICAL DECISION MAKING  Pt also made aware that follow up with PMD is necessary.     MDM  Number of Diagnoses or Management Options  Acute non-recurrent sinusitis of other sinus:      Amount and/or Complexity of Data Reviewed  Decide to obtain previous medical records or to obtain history from someone other than the patient: yes  Review and summarize past medical records: yes (Pt has multiple routine office visits for migraines.)    Patient  Progress  Patient progress: stable         DIAGNOSIS  Final diagnoses:   Acute non-recurrent sinusitis of other sinus       DISPOSITION  DISCHARGE    Patient discharged in stable condition.    Reviewed implications of results, diagnosis, meds, responsibility to follow up, warning signs and symptoms of possible worsening, potential complications and reasons to return to ER, including fever, worsening pain or any concerns.    Patient/Family voiced understanding of above instructions.    Discussed plan for discharge, as there is no emergent indication for admission. Patient referred to primary care provider for BP management due to today's BP. Pt/family is agreeable and understands need for follow up and repeat testing.  Pt is aware that discharge does not mean that nothing is wrong but it indicates no emergency is present that requires admission and they must continue care with follow-up as given below or physician of their choice.     FOLLOW-UP  Chava Bridges MD  [address] [address]  [phone]    Call   As needed, If symptoms worsen         Medication List      No changes were made to your prescriptions during this visit.           Latest Documented Vital Signs:  As of 5:40 PM  BP- 146/96 HR- 94 Temp- 97.7 °F (36.5 °C) O2 sat- 100%    --  Documentation assistance provided by jamaica Gonsalez for Dr. Osuna.  Information recorded by the scribe was done at my direction and has been verified and validated by me.     Dinora Gonsalez  05/10/18 6925       Nile Osuna MD  05/10/18 1490

## 2018-05-10 NOTE — ED TRIAGE NOTES
Pt c/o frontal headache that started Saturday. Pain radiates in behind eyes. Temporary relief with astelin.

## 2018-05-15 ENCOUNTER — TELEPHONE (OUTPATIENT)
Dept: SOCIAL WORK | Facility: HOSPITAL | Age: 38
End: 2018-05-15

## 2018-05-15 DIAGNOSIS — G44.039 EPISODIC PAROXYSMAL HEMICRANIA, NOT INTRACTABLE: ICD-10-CM

## 2018-05-15 RX ORDER — HYDROCODONE BITARTRATE AND ACETAMINOPHEN 7.5; 325 MG/1; MG/1
TABLET ORAL
Qty: 180 TABLET | Refills: 0 | Status: SHIPPED | OUTPATIENT
Start: 2018-05-15 | End: 2018-06-12 | Stop reason: SDUPTHER

## 2018-06-12 DIAGNOSIS — G44.039 EPISODIC PAROXYSMAL HEMICRANIA, NOT INTRACTABLE: ICD-10-CM

## 2018-06-12 RX ORDER — HYDROCODONE BITARTRATE AND ACETAMINOPHEN 7.5; 325 MG/1; MG/1
TABLET ORAL
Qty: 180 TABLET | Refills: 0 | Status: SHIPPED | OUTPATIENT
Start: 2018-06-12 | End: 2018-07-17 | Stop reason: SDUPTHER

## 2018-07-12 ENCOUNTER — OFFICE VISIT (OUTPATIENT)
Dept: INTERNAL MEDICINE | Facility: CLINIC | Age: 38
End: 2018-07-12

## 2018-07-12 VITALS
OXYGEN SATURATION: 98 % | WEIGHT: 219.6 LBS | BODY MASS INDEX: 34.47 KG/M2 | DIASTOLIC BLOOD PRESSURE: 103 MMHG | SYSTOLIC BLOOD PRESSURE: 160 MMHG | HEART RATE: 85 BPM | HEIGHT: 67 IN | TEMPERATURE: 98.8 F

## 2018-07-12 DIAGNOSIS — J01.01 ACUTE RECURRENT MAXILLARY SINUSITIS: ICD-10-CM

## 2018-07-12 DIAGNOSIS — Z71.6 TOBACCO ABUSE COUNSELING: ICD-10-CM

## 2018-07-12 DIAGNOSIS — Z71.6 TOBACCO ABUSE COUNSELING: Primary | ICD-10-CM

## 2018-07-12 DIAGNOSIS — I10 ESSENTIAL HYPERTENSION: ICD-10-CM

## 2018-07-12 PROBLEM — G43.C0 PERIODIC HEADACHE SYNDROME: Status: ACTIVE | Noted: 2018-07-12

## 2018-07-12 PROCEDURE — 99213 OFFICE O/P EST LOW 20 MIN: CPT | Performed by: FAMILY MEDICINE

## 2018-07-12 RX ORDER — FLUCONAZOLE 150 MG/1
150 TABLET ORAL ONCE
Qty: 3 TABLET | Refills: 0 | Status: SHIPPED | OUTPATIENT
Start: 2018-07-12 | End: 2018-07-12

## 2018-07-12 RX ORDER — IRBESARTAN 75 MG/1
75 TABLET ORAL NIGHTLY
Qty: 30 TABLET | Refills: 5 | Status: SHIPPED | OUTPATIENT
Start: 2018-07-12 | End: 2020-06-19

## 2018-07-12 RX ORDER — NORETHINDRONE ACETATE AND ETHINYL ESTRADIOL, AND FERROUS FUMARATE 1MG-20(24)
KIT ORAL
COMMUNITY
End: 2018-12-06

## 2018-07-12 RX ORDER — AMOXICILLIN 500 MG/1
1000 CAPSULE ORAL 3 TIMES DAILY
Qty: 60 CAPSULE | Refills: 0 | Status: SHIPPED | OUTPATIENT
Start: 2018-07-12 | End: 2018-12-06

## 2018-07-12 NOTE — PROGRESS NOTES
CC:fatigue,allergic rhinitis, migraine HAs    Subjective.../HPI  Patient present today with1) migraines -worse with allergies- 4 Norcos/day    I have reviewed the patient's medical history in detail and updated the computerized patient record.    Past Medical History:   Diagnosis Date   • Anxiety    • Bump     on the wrist   • Ganglion cyst of wrist     R wrist   • Headache    • Migraine    • Right wrist pain    • Stress        Past Surgical History:   Procedure Laterality Date   •  SECTION     • CHOLECYSTECTOMY         Family History   Problem Relation Age of Onset   • Breast cancer Maternal Aunt    • Stomach cancer Maternal Aunt    • Hypertension Maternal Grandmother    • Thyroid disease Maternal Grandmother    • Hypertension Maternal Grandfather    • Thyroid disease Maternal Grandfather        Social History     Social History   • Marital status: Single     Spouse name: N/A   • Number of children: N/A   • Years of education: N/A     Occupational History   • Not on file.     Social History Main Topics   • Smoking status: Former Smoker     Packs/day: 1.00     Years: 17.00     Types: Cigarettes   • Smokeless tobacco: Never Used   • Alcohol use Yes      Comment: social   • Drug use: No   • Sexual activity: Defer     Other Topics Concern   • Not on file     Social History Narrative   • No narrative on file       Most Recent Immunizations   Administered Date(s) Administered   • Influenza, Quadrivalent 2016       Review of Systems:   Review of Systems   Constitutional: Negative.    HENT: Negative.    Eyes: Negative.    Respiratory: Negative.    Cardiovascular: Negative.    Gastrointestinal: Negative.    Endocrine: Negative.    Genitourinary: Negative.    Musculoskeletal: Negative.    Skin: Negative.    Allergic/Immunologic: Negative.    Neurological: Negative.    Hematological: Negative.    Psychiatric/Behavioral: Negative.          Physical Exam   Constitutional: She is oriented to person, place,  "and time. She appears well-developed and well-nourished.   HENT:   bilat maxillary tendwerness   Cardiovascular: Normal rate, regular rhythm and normal heart sounds.    Pulmonary/Chest: Effort normal and breath sounds normal.   Neurological: She is alert and oriented to person, place, and time.   Psychiatric: She has a normal mood and affect. Her behavior is normal.   Vitals reviewed.        Vital Signs     Vitals:    07/12/18 1137   BP: (!) 160/103   Pulse: 85   Temp: 98.8 °F (37.1 °C)   TempSrc: Oral   SpO2: 98%   Weight: 99.6 kg (219 lb 9.6 oz)   Height: 170.2 cm (67.01\")          Results Review:      REVIEWED AND DISCUSSED CLINICAL RESULTS WITH PATIENT      Requested Prescriptions     Signed Prescriptions Disp Refills   • fluconazole (DIFLUCAN) 150 MG tablet 3 tablet 0     Sig: Take 1 tablet by mouth 1 (One) Time for 1 dose.   • amoxicillin (AMOXIL) 500 MG capsule 60 capsule 0     Sig: Take 2 capsules by mouth 3 (Three) Times a Day.   • irbesartan (AVAPRO) 75 MG tablet 30 tablet 5     Sig: Take 1 tablet by mouth Every Night.         Current Outpatient Prescriptions:   •  azelastine (ASTELIN) 0.1 % nasal spray, 2 sprays into each nostril Daily. Use in each nostril as directed, Disp: 1 each, Rfl: 12  •  clindamycin (CLEOCIN-T) 1 % external solution, Apply  topically 2 (Two) Times a Day., Disp: 60 mL, Rfl: 5  •  FLUoxetine (PROZAC) 10 MG capsule, Take one capsule once daily x 7 days, then increase to two capsules daily, Disp: 60 capsule, Rfl: 0  •  FLUoxetine (PROZAC) 20 MG capsule, Take 1 capsule by mouth Daily., Disp: 30 capsule, Rfl: 3  •  HYDROcodone-acetaminophen (NORCO) 7.5-325 MG per tablet, TAKE 1 TAB Q 4 TO 6 HRS, Disp: 180 tablet, Rfl: 0  •  minocycline (MINOCIN,DYNACIN) 75 MG capsule, Take 75 mg by mouth 2 (Two) Times a Day., Disp: , Rfl:   •  nicotine (NICODERM CQ) 14 MG/24HR patch, Place 1 patch on the skin Daily., Disp: 14 patch, Rfl: 0  •  nicotine (NICODERM CQ) 21 MG/24HR patch, Place 1 patch on the " skin Daily., Disp: 14 patch, Rfl: 0  •  nicotine (NICODERM CQ) 7 MG/24HR patch, Place 1 patch on the skin Daily., Disp: 56 patch, Rfl: 1  •  Norethin Ace-Eth Estrad-FE (TAYTULLA) 1-20 MG-MCG(24) capsule, Take  by mouth., Disp: , Rfl:   •  propranolol LA (INDERAL LA) 60 MG 24 hr capsule, Take 1 capsule by mouth Daily., Disp: 30 capsule, Rfl: 5  •  sulfamethoxazole-trimethoprim (BACTRIM DS) 800-160 MG per tablet, Take 1 tablet by mouth 2 (Two) Times a Day., Disp: 20 tablet, Rfl: 0  •  amoxicillin (AMOXIL) 500 MG capsule, Take 2 capsules by mouth 3 (Three) Times a Day., Disp: 60 capsule, Rfl: 0  •  fluconazole (DIFLUCAN) 150 MG tablet, Take 1 tablet by mouth 1 (One) Time for 1 dose., Disp: 3 tablet, Rfl: 0  •  irbesartan (AVAPRO) 75 MG tablet, Take 1 tablet by mouth Every Night., Disp: 30 tablet, Rfl: 5    Procedures          Diagnoses and all orders for this visit:    Tobacco abuse counseling    Acute recurrent maxillary sinusitis  -     amoxicillin (AMOXIL) 500 MG capsule; Take 2 capsules by mouth 3 (Three) Times a Day.    Essential hypertension  -     irbesartan (AVAPRO) 75 MG tablet; Take 1 tablet by mouth Every Night.    Other orders  -     Norethin Ace-Eth Estrad-FE (TAYTULLA) 1-20 MG-MCG(24) capsule; Take  by mouth.  -     fluconazole (DIFLUCAN) 150 MG tablet; Take 1 tablet by mouth 1 (One) Time for 1 dose.         Return in about 3 months (around 10/12/2018) for Recheck.    Chava Bridges M.D  07/12/18  12:25 PM

## 2018-07-17 DIAGNOSIS — G44.039 EPISODIC PAROXYSMAL HEMICRANIA, NOT INTRACTABLE: ICD-10-CM

## 2018-07-17 RX ORDER — HYDROCODONE BITARTRATE AND ACETAMINOPHEN 7.5; 325 MG/1; MG/1
TABLET ORAL
Qty: 180 TABLET | Refills: 0 | Status: SHIPPED | OUTPATIENT
Start: 2018-07-17 | End: 2018-08-15 | Stop reason: SDUPTHER

## 2018-08-15 DIAGNOSIS — G44.039 EPISODIC PAROXYSMAL HEMICRANIA, NOT INTRACTABLE: ICD-10-CM

## 2018-08-16 RX ORDER — HYDROCODONE BITARTRATE AND ACETAMINOPHEN 7.5; 325 MG/1; MG/1
TABLET ORAL
Qty: 180 TABLET | Refills: 0 | Status: SHIPPED | OUTPATIENT
Start: 2018-08-16 | End: 2018-09-13 | Stop reason: SDUPTHER

## 2018-09-13 DIAGNOSIS — G44.039 EPISODIC PAROXYSMAL HEMICRANIA, NOT INTRACTABLE: ICD-10-CM

## 2018-09-13 RX ORDER — HYDROCODONE BITARTRATE AND ACETAMINOPHEN 7.5; 325 MG/1; MG/1
TABLET ORAL
Qty: 180 TABLET | Refills: 0 | Status: SHIPPED | OUTPATIENT
Start: 2018-09-13 | End: 2018-10-10 | Stop reason: SDUPTHER

## 2018-10-08 DIAGNOSIS — G44.039 EPISODIC PAROXYSMAL HEMICRANIA, NOT INTRACTABLE: ICD-10-CM

## 2018-10-08 RX ORDER — PROPRANOLOL HCL 60 MG
60 CAPSULE, EXTENDED RELEASE 24HR ORAL DAILY
Qty: 30 CAPSULE | Refills: 5 | Status: SHIPPED | OUTPATIENT
Start: 2018-10-08 | End: 2018-12-06

## 2018-10-10 DIAGNOSIS — G44.039 EPISODIC PAROXYSMAL HEMICRANIA, NOT INTRACTABLE: ICD-10-CM

## 2018-10-11 RX ORDER — HYDROCODONE BITARTRATE AND ACETAMINOPHEN 7.5; 325 MG/1; MG/1
TABLET ORAL
Qty: 180 TABLET | Refills: 0 | Status: SHIPPED | OUTPATIENT
Start: 2018-10-11 | End: 2018-11-08 | Stop reason: SDUPTHER

## 2018-11-08 DIAGNOSIS — G44.039 EPISODIC PAROXYSMAL HEMICRANIA, NOT INTRACTABLE: ICD-10-CM

## 2018-11-08 RX ORDER — HYDROCODONE BITARTRATE AND ACETAMINOPHEN 7.5; 325 MG/1; MG/1
TABLET ORAL
Qty: 180 TABLET | Refills: 0 | Status: SHIPPED | OUTPATIENT
Start: 2018-11-08 | End: 2018-12-06 | Stop reason: SDUPTHER

## 2018-12-05 DIAGNOSIS — Z71.6 TOBACCO ABUSE COUNSELING: ICD-10-CM

## 2018-12-06 ENCOUNTER — OFFICE VISIT (OUTPATIENT)
Dept: INTERNAL MEDICINE | Facility: CLINIC | Age: 38
End: 2018-12-06

## 2018-12-06 VITALS
OXYGEN SATURATION: 99 % | DIASTOLIC BLOOD PRESSURE: 86 MMHG | SYSTOLIC BLOOD PRESSURE: 143 MMHG | HEIGHT: 67 IN | BODY MASS INDEX: 34.91 KG/M2 | WEIGHT: 222.4 LBS | TEMPERATURE: 98.6 F | HEART RATE: 78 BPM

## 2018-12-06 DIAGNOSIS — G44.039 EPISODIC PAROXYSMAL HEMICRANIA, NOT INTRACTABLE: ICD-10-CM

## 2018-12-06 DIAGNOSIS — G43.001 MIGRAINE WITHOUT AURA AND WITH STATUS MIGRAINOSUS, NOT INTRACTABLE: ICD-10-CM

## 2018-12-06 DIAGNOSIS — J01.01 ACUTE RECURRENT MAXILLARY SINUSITIS: ICD-10-CM

## 2018-12-06 DIAGNOSIS — I10 ESSENTIAL HYPERTENSION: Primary | ICD-10-CM

## 2018-12-06 DIAGNOSIS — R53.82 CHRONIC FATIGUE: ICD-10-CM

## 2018-12-06 DIAGNOSIS — J30.1 SEASONAL ALLERGIC RHINITIS DUE TO POLLEN: ICD-10-CM

## 2018-12-06 DIAGNOSIS — Z71.6 TOBACCO ABUSE COUNSELING: ICD-10-CM

## 2018-12-06 PROBLEM — G43.909 MIGRAINE: Status: ACTIVE | Noted: 2018-12-06

## 2018-12-06 PROCEDURE — 99213 OFFICE O/P EST LOW 20 MIN: CPT | Performed by: FAMILY MEDICINE

## 2018-12-06 RX ORDER — FLUCONAZOLE 150 MG/1
150 TABLET ORAL ONCE
Qty: 2 TABLET | Refills: 0 | Status: SHIPPED | OUTPATIENT
Start: 2018-12-06 | End: 2018-12-06

## 2018-12-06 RX ORDER — HYDROCODONE BITARTRATE AND ACETAMINOPHEN 7.5; 325 MG/1; MG/1
TABLET ORAL
Qty: 180 TABLET | Refills: 0 | Status: SHIPPED | OUTPATIENT
Start: 2018-12-06 | End: 2019-01-07 | Stop reason: SDUPTHER

## 2018-12-06 RX ORDER — AMOXICILLIN 500 MG/1
1000 CAPSULE ORAL 3 TIMES DAILY
Qty: 30 CAPSULE | Refills: 0 | Status: SHIPPED | OUTPATIENT
Start: 2018-12-06 | End: 2019-01-08

## 2018-12-06 NOTE — PROGRESS NOTES
CC:sinusitis,chronic pain    Subjective.../HPI  Patient present today with1) danita-HAs  2) allergies with tenderness over Maxillary sinus  I have reviewed the patient's medical history in detail and updated the computerized patient record.    Past Medical History:   Diagnosis Date   • Anxiety    • Bump     on the wrist   • Ganglion cyst of wrist     R wrist   • Headache    • Migraine    • Right wrist pain    • Stress        Past Surgical History:   Procedure Laterality Date   •  SECTION     • CHOLECYSTECTOMY         Family History   Problem Relation Age of Onset   • Breast cancer Maternal Aunt    • Stomach cancer Maternal Aunt    • Hypertension Maternal Grandmother    • Thyroid disease Maternal Grandmother    • Hypertension Maternal Grandfather    • Thyroid disease Maternal Grandfather        Social History     Socioeconomic History   • Marital status: Single     Spouse name: Not on file   • Number of children: Not on file   • Years of education: Not on file   • Highest education level: Not on file   Social Needs   • Financial resource strain: Not on file   • Food insecurity - worry: Not on file   • Food insecurity - inability: Not on file   • Transportation needs - medical: Not on file   • Transportation needs - non-medical: Not on file   Occupational History   • Not on file   Tobacco Use   • Smoking status: Former Smoker     Packs/day: 1.00     Years: 17.00     Pack years: 17.00     Types: Cigarettes   • Smokeless tobacco: Never Used   Substance and Sexual Activity   • Alcohol use: Yes     Comment: social   • Drug use: No   • Sexual activity: Defer   Other Topics Concern   • Not on file   Social History Narrative   • Not on file       Most Recent Immunizations   Administered Date(s) Administered   • Flu Mist 2016       Review of Systems:   Review of Systems   Constitutional: Negative.    HENT: Negative.    Eyes: Negative.    Respiratory: Negative.    Cardiovascular: Negative.    Endocrine:  "Negative.    Genitourinary: Negative.    Musculoskeletal: Negative.    Skin: Negative.    Allergic/Immunologic: Negative.    Neurological: Negative.    Hematological: Negative.    Psychiatric/Behavioral: Negative.          Physical Exam   Constitutional: She is oriented to person, place, and time. She appears well-developed and well-nourished.   HENT:   Maxillary tenderness  Nasal congestion   Cardiovascular: Normal rate and regular rhythm.   Pulmonary/Chest: Effort normal and breath sounds normal.   Neurological: She is alert and oriented to person, place, and time.   Psychiatric: She has a normal mood and affect. Her behavior is normal.   Vitals reviewed.        Vital Signs     Vitals:    12/06/18 1714   BP: 143/86   BP Location: Left arm   Patient Position: Sitting   Cuff Size: Large Adult   Pulse: 78   Temp: 98.6 °F (37 °C)   TempSrc: Oral   SpO2: 99%   Weight: 101 kg (222 lb 6.4 oz)   Height: 170.2 cm (67.01\")          Results Review:      REVIEWED AND DISCUSSED CLINICAL RESULTS WITH PATIENT         Requested Prescriptions     Signed Prescriptions Disp Refills   • amoxicillin (AMOXIL) 500 MG capsule 30 capsule 0     Sig: Take 2 capsules by mouth 3 (Three) Times a Day.   • fluconazole (DIFLUCAN) 150 MG tablet 2 tablet 0     Sig: Take 1 tablet by mouth 1 (One) Time for 1 dose.   • HYDROcodone-acetaminophen (NORCO) 7.5-325 MG per tablet 180 tablet 0     Sig: TAKE 1 TAB Q 4 TO 6 HRS         Current Outpatient Medications:   •  HYDROcodone-acetaminophen (NORCO) 7.5-325 MG per tablet, TAKE 1 TAB Q 4 TO 6 HRS, Disp: 180 tablet, Rfl: 0  •  irbesartan (AVAPRO) 75 MG tablet, Take 1 tablet by mouth Every Night., Disp: 30 tablet, Rfl: 5  •  nicotine (NICODERM CQ) 7 MG/24HR patch, Place 1 patch on the skin Daily., Disp: 56 patch, Rfl: 1  •  NICOTINE STEP 1 21 MG/24HR patch, PLACE 1 PATCH ON THE SKIN DAILY., Disp: 14 patch, Rfl: 0  •  NICOTINE STEP 2 14 MG/24HR patch, PLACE 1 PATCH ON THE SKIN DAILY., Disp: 14 patch, Rfl: " 0  •  amoxicillin (AMOXIL) 500 MG capsule, Take 2 capsules by mouth 3 (Three) Times a Day., Disp: 30 capsule, Rfl: 0  •  fluconazole (DIFLUCAN) 150 MG tablet, Take 1 tablet by mouth 1 (One) Time for 1 dose., Disp: 2 tablet, Rfl: 0    Procedures          Diagnoses and all orders for this visit:    Essential hypertension    Acute recurrent maxillary sinusitis  -     amoxicillin (AMOXIL) 500 MG capsule; Take 2 capsules by mouth 3 (Three) Times a Day.  -     fluconazole (DIFLUCAN) 150 MG tablet; Take 1 tablet by mouth 1 (One) Time for 1 dose.    Migraine without aura and with status migrainosus, not intractable  -     HYDROcodone-acetaminophen (NORCO) 7.5-325 MG per tablet; TAKE 1 TAB Q 4 TO 6 HRS    Tobacco abuse counseling    Seasonal allergic rhinitis due to pollen    Episodic paroxysmal hemicrania, not intractable  -     HYDROcodone-acetaminophen (NORCO) 7.5-325 MG per tablet; TAKE 1 TAB Q 4 TO 6 HRS    Chronic fatigue  -     Comprehensive Metabolic Panel; Future  -     CBC & Differential; Future  -     TSH; Future         Return in about 3 months (around 3/6/2019) for Recheck.    Chava Bridges M.D  12/06/18  6:34 PM

## 2018-12-11 ENCOUNTER — RESULTS ENCOUNTER (OUTPATIENT)
Dept: INTERNAL MEDICINE | Facility: CLINIC | Age: 38
End: 2018-12-11

## 2018-12-11 DIAGNOSIS — R53.82 CHRONIC FATIGUE: ICD-10-CM

## 2018-12-11 LAB
ALBUMIN SERPL-MCNC: 4.4 G/DL (ref 3.5–5.2)
ALBUMIN/GLOB SERPL: 1.4 G/DL
ALP SERPL-CCNC: 60 U/L (ref 39–117)
ALT SERPL-CCNC: 10 U/L (ref 1–33)
AST SERPL-CCNC: 15 U/L (ref 1–32)
BASOPHILS # BLD AUTO: 0.03 10*3/MM3 (ref 0–0.2)
BASOPHILS NFR BLD AUTO: 0.5 % (ref 0–1.5)
BILIRUB SERPL-MCNC: 0.4 MG/DL (ref 0.1–1.2)
BUN SERPL-MCNC: 5 MG/DL (ref 6–20)
BUN/CREAT SERPL: 6.9 (ref 7–25)
CALCIUM SERPL-MCNC: 11.3 MG/DL (ref 8.6–10.5)
CHLORIDE SERPL-SCNC: 104 MMOL/L (ref 98–107)
CO2 SERPL-SCNC: 24.4 MMOL/L (ref 22–29)
CREAT SERPL-MCNC: 0.72 MG/DL (ref 0.57–1)
EOSINOPHIL # BLD AUTO: 0.12 10*3/MM3 (ref 0–0.7)
EOSINOPHIL NFR BLD AUTO: 2 % (ref 0.3–6.2)
ERYTHROCYTE [DISTWIDTH] IN BLOOD BY AUTOMATED COUNT: 16.9 % (ref 11.7–13)
GLOBULIN SER CALC-MCNC: 3.1 GM/DL
GLUCOSE SERPL-MCNC: 87 MG/DL (ref 65–99)
HCT VFR BLD AUTO: 34 % (ref 35.6–45.5)
HGB BLD-MCNC: 11.3 G/DL (ref 11.9–15.5)
IMM GRANULOCYTES # BLD: 0.01 10*3/MM3 (ref 0–0.03)
IMM GRANULOCYTES NFR BLD: 0.2 % (ref 0–0.5)
LYMPHOCYTES # BLD AUTO: 1.99 10*3/MM3 (ref 0.9–4.8)
LYMPHOCYTES NFR BLD AUTO: 33 % (ref 19.6–45.3)
MCH RBC QN AUTO: 27.4 PG (ref 26.9–32)
MCHC RBC AUTO-ENTMCNC: 33.2 G/DL (ref 32.4–36.3)
MCV RBC AUTO: 82.3 FL (ref 80.5–98.2)
MONOCYTES # BLD AUTO: 0.45 10*3/MM3 (ref 0.2–1.2)
MONOCYTES NFR BLD AUTO: 7.5 % (ref 5–12)
NEUTROPHILS # BLD AUTO: 3.44 10*3/MM3 (ref 1.9–8.1)
NEUTROPHILS NFR BLD AUTO: 57 % (ref 42.7–76)
PLATELET # BLD AUTO: 188 10*3/MM3 (ref 140–500)
POTASSIUM SERPL-SCNC: 4.7 MMOL/L (ref 3.5–5.2)
PROT SERPL-MCNC: 7.5 G/DL (ref 6–8.5)
RBC # BLD AUTO: 4.13 10*6/MM3 (ref 3.9–5.2)
SODIUM SERPL-SCNC: 141 MMOL/L (ref 136–145)
TSH SERPL DL<=0.005 MIU/L-ACNC: 1.8 MIU/ML (ref 0.27–4.2)
WBC # BLD AUTO: 6.03 10*3/MM3 (ref 4.5–10.7)

## 2019-01-07 DIAGNOSIS — G44.039 EPISODIC PAROXYSMAL HEMICRANIA, NOT INTRACTABLE: ICD-10-CM

## 2019-01-07 DIAGNOSIS — G43.001 MIGRAINE WITHOUT AURA AND WITH STATUS MIGRAINOSUS, NOT INTRACTABLE: ICD-10-CM

## 2019-01-08 ENCOUNTER — OFFICE VISIT (OUTPATIENT)
Dept: INTERNAL MEDICINE | Facility: CLINIC | Age: 39
End: 2019-01-08

## 2019-01-08 VITALS
DIASTOLIC BLOOD PRESSURE: 93 MMHG | HEIGHT: 67 IN | WEIGHT: 220 LBS | SYSTOLIC BLOOD PRESSURE: 124 MMHG | BODY MASS INDEX: 34.53 KG/M2 | RESPIRATION RATE: 16 BRPM | HEART RATE: 81 BPM | OXYGEN SATURATION: 99 % | TEMPERATURE: 98.4 F

## 2019-01-08 DIAGNOSIS — N39.0 URINARY TRACT INFECTION WITHOUT HEMATURIA, SITE UNSPECIFIED: Primary | ICD-10-CM

## 2019-01-08 PROCEDURE — 99213 OFFICE O/P EST LOW 20 MIN: CPT | Performed by: NURSE PRACTITIONER

## 2019-01-08 RX ORDER — HYDROCODONE BITARTRATE AND ACETAMINOPHEN 7.5; 325 MG/1; MG/1
TABLET ORAL
Qty: 180 TABLET | Refills: 0 | Status: SHIPPED | OUTPATIENT
Start: 2019-01-08 | End: 2019-02-05 | Stop reason: SDUPTHER

## 2019-01-08 RX ORDER — NITROFURANTOIN 25; 75 MG/1; MG/1
100 CAPSULE ORAL 2 TIMES DAILY
Qty: 14 CAPSULE | Refills: 0 | Status: SHIPPED | OUTPATIENT
Start: 2019-01-08 | End: 2019-01-09 | Stop reason: SDUPTHER

## 2019-01-08 NOTE — PROGRESS NOTES
Subjective   Martha Diaz is a 38 y.o. female.   CC: Urinary frequency and foul smelling urine    Patient presents for evaluation of possible UTI.  This is a 38-year-old female patient of Dr. Bridges.  She's been treated over the last few months for recurrent sinusitis with antibiotics, last completing a course of amoxicillin in December 2018.  She presents today for evaluation of possible UTI.  She states that she has been treated off and on increased infections related to the antibiotic use, but for the last week has noticed that she is urinating more frequently and urgently, and is having foul-smelling urine.  She does note that her urine appears cloudy.  She denies seeing any mucus in her urine, but she is currently on her period.  She denies any low back pain or pelvic pain.  She does endorse intermittent menstrual cramps.  She denies fever, chills, myalgias.  She denies development of any other new issues today.         The following portions of the patient's history were reviewed and updated as appropriate: allergies, current medications, past family history, past medical history, past social history, past surgical history and problem list.    Review of Systems   Constitutional: Negative for activity change, chills, fatigue, fever, unexpected weight gain and unexpected weight loss.   HENT: Negative for congestion, hearing loss, postnasal drip, sinus pressure, sneezing, sore throat and tinnitus.    Eyes: Negative for photophobia, pain and visual disturbance.   Respiratory: Negative for cough, chest tightness, shortness of breath and wheezing.    Cardiovascular: Negative for chest pain, palpitations and leg swelling.   Gastrointestinal: Negative for abdominal distention, abdominal pain, constipation, diarrhea, nausea and vomiting.   Endocrine: Negative for polydipsia, polyphagia and polyuria.   Genitourinary: Positive for dysuria, frequency and urgency. Negative for hematuria.        Foul-smelling, cloudy  "urine   Neurological: Negative for dizziness, weakness, numbness and headache.   All other systems reviewed and are negative.      Objective    /93 (BP Location: Left arm, Patient Position: Sitting, Cuff Size: Adult)   Pulse 81   Temp 98.4 °F (36.9 °C) (Oral)   Resp 16   Ht 170.2 cm (67.01\")   Wt 99.8 kg (220 lb)   SpO2 99%   BMI 34.45 kg/m²     Physical Exam   Constitutional: She is oriented to person, place, and time. She appears well-developed and well-nourished. No distress.   HENT:   Head: Normocephalic and atraumatic.   Right Ear: External ear normal.   Left Ear: External ear normal.   Nose: Nose normal.   Mouth/Throat: Oropharynx is clear and moist. No oropharyngeal exudate.   Eyes: Conjunctivae and EOM are normal. Pupils are equal, round, and reactive to light. Right eye exhibits no discharge. Left eye exhibits no discharge.   Neck: Normal range of motion. Neck supple.   Cardiovascular: Normal rate, regular rhythm, normal heart sounds and intact distal pulses. Exam reveals no gallop and no friction rub.   No murmur heard.  Pulmonary/Chest: Effort normal and breath sounds normal. No stridor. No respiratory distress. She has no wheezes. She has no rales. She exhibits no tenderness.   Lungs are CTA bilaterally   Abdominal: Soft. Bowel sounds are normal. She exhibits no distension. There is no tenderness.   Musculoskeletal: Normal range of motion.   Neurological: She is alert and oriented to person, place, and time.   Skin: Skin is warm and dry. Capillary refill takes less than 2 seconds. She is not diaphoretic.   Psychiatric: She has a normal mood and affect. Her behavior is normal. Judgment and thought content normal.   Nursing note and vitals reviewed.        Assessment/Plan   Diagnoses and all orders for this visit:    Urinary tract infection without hematuria, site unspecified  -     Urinalysis With Microscopic - Urine, Clean Catch  -     Urine Culture - Urine, Urine, Clean Catch  -     " nitrofurantoin, macrocrystal-monohydrate, (MACROBID) 100 MG capsule; Take 1 capsule by mouth 2 (Two) Times a Day for 7 days.      - UTI: We will treat based on symptoms with Macrobid 100 mg twice a day ×7 days, as well as send off urinalysis and urine culture.  She is on her period, which is of note if UA comes back positive for RBCs.    -Follow up if symptoms persist or worsen.  We will contact patient with the results of her UA and culture and adjust antibiotics if necessary based on sensitivity.

## 2019-01-09 ENCOUNTER — TELEPHONE (OUTPATIENT)
Dept: INTERNAL MEDICINE | Facility: CLINIC | Age: 39
End: 2019-01-09

## 2019-01-09 DIAGNOSIS — N39.0 URINARY TRACT INFECTION WITHOUT HEMATURIA, SITE UNSPECIFIED: ICD-10-CM

## 2019-01-09 RX ORDER — NITROFURANTOIN 25; 75 MG/1; MG/1
100 CAPSULE ORAL 2 TIMES DAILY
Qty: 14 CAPSULE | Refills: 0 | Status: SHIPPED | OUTPATIENT
Start: 2019-01-09 | End: 2019-01-16

## 2019-01-13 LAB
APPEARANCE UR: CLEAR
BACTERIA #/AREA URNS HPF: ABNORMAL /HPF
BACTERIA UR CULT: ABNORMAL
BACTERIA UR CULT: ABNORMAL
BILIRUB UR QL STRIP: NEGATIVE
CASTS URNS MICRO: ABNORMAL
COLOR UR: YELLOW
EPI CELLS #/AREA URNS HPF: ABNORMAL /HPF
GLUCOSE UR QL: NEGATIVE
HGB UR QL STRIP: NEGATIVE
KETONES UR QL STRIP: NEGATIVE
LEUKOCYTE ESTERASE UR QL STRIP: (no result)
NITRITE UR QL STRIP: NEGATIVE
OTHER ANTIBIOTIC SUSC ISLT: ABNORMAL
PH UR STRIP: 8 [PH] (ref 5–8)
PROT UR QL STRIP: NEGATIVE
RBC #/AREA URNS HPF: ABNORMAL /HPF
SP GR UR: 1.02 (ref 1–1.03)
UROBILINOGEN UR STRIP-MCNC: (no result) MG/DL
WBC #/AREA URNS HPF: ABNORMAL /HPF

## 2019-01-14 NOTE — PROGRESS NOTES
Please call patient and let her know that her urine did show up positive for UTI susceptible to the antibiotic we put her on.

## 2019-02-05 DIAGNOSIS — G43.001 MIGRAINE WITHOUT AURA AND WITH STATUS MIGRAINOSUS, NOT INTRACTABLE: ICD-10-CM

## 2019-02-05 DIAGNOSIS — G44.039 EPISODIC PAROXYSMAL HEMICRANIA, NOT INTRACTABLE: ICD-10-CM

## 2019-02-06 RX ORDER — HYDROCODONE BITARTRATE AND ACETAMINOPHEN 7.5; 325 MG/1; MG/1
TABLET ORAL
Qty: 180 TABLET | Refills: 0 | Status: SHIPPED | OUTPATIENT
Start: 2019-02-06 | End: 2019-03-06 | Stop reason: SDUPTHER

## 2019-03-06 DIAGNOSIS — G43.001 MIGRAINE WITHOUT AURA AND WITH STATUS MIGRAINOSUS, NOT INTRACTABLE: ICD-10-CM

## 2019-03-06 DIAGNOSIS — G44.039 EPISODIC PAROXYSMAL HEMICRANIA, NOT INTRACTABLE: ICD-10-CM

## 2019-03-07 RX ORDER — HYDROCODONE BITARTRATE AND ACETAMINOPHEN 7.5; 325 MG/1; MG/1
TABLET ORAL
Qty: 180 TABLET | Refills: 0 | Status: SHIPPED | OUTPATIENT
Start: 2019-03-07 | End: 2019-04-03 | Stop reason: SDUPTHER

## 2019-03-21 ENCOUNTER — OFFICE VISIT (OUTPATIENT)
Dept: INTERNAL MEDICINE | Facility: CLINIC | Age: 39
End: 2019-03-21

## 2019-03-21 VITALS
TEMPERATURE: 97.4 F | HEART RATE: 80 BPM | OXYGEN SATURATION: 100 % | WEIGHT: 223 LBS | HEIGHT: 67 IN | BODY MASS INDEX: 35 KG/M2 | DIASTOLIC BLOOD PRESSURE: 82 MMHG | SYSTOLIC BLOOD PRESSURE: 143 MMHG

## 2019-03-21 DIAGNOSIS — G43.C0 PERIODIC HEADACHE SYNDROME, NOT INTRACTABLE: ICD-10-CM

## 2019-03-21 DIAGNOSIS — G44.019 EPISODIC CLUSTER HEADACHE, NOT INTRACTABLE: Primary | ICD-10-CM

## 2019-03-21 DIAGNOSIS — F41.9 ANXIETY: ICD-10-CM

## 2019-03-21 PROCEDURE — 99213 OFFICE O/P EST LOW 20 MIN: CPT | Performed by: FAMILY MEDICINE

## 2019-03-21 NOTE — PROGRESS NOTES
CC:chronic HAs    Subjective.../HPI  Patient present today withneed for f/u re her HAs =well controlled    I have reviewed the patient's medical history in detail and updated the computerized patient record.    Past Medical History:   Diagnosis Date   • Anxiety    • Bump     on the wrist   • Ganglion cyst of wrist     R wrist   • Headache    • Migraine    • Right wrist pain    • Stress        Past Surgical History:   Procedure Laterality Date   •  SECTION     • CHOLECYSTECTOMY         Family History   Problem Relation Age of Onset   • Breast cancer Maternal Aunt    • Stomach cancer Maternal Aunt    • Hypertension Maternal Grandmother    • Thyroid disease Maternal Grandmother    • Hypertension Maternal Grandfather    • Thyroid disease Maternal Grandfather        Social History     Socioeconomic History   • Marital status: Single     Spouse name: Not on file   • Number of children: Not on file   • Years of education: Not on file   • Highest education level: Not on file   Tobacco Use   • Smoking status: Former Smoker     Packs/day: 1.00     Years: 17.00     Pack years: 17.00     Types: Cigarettes   • Smokeless tobacco: Never Used   Substance and Sexual Activity   • Alcohol use: Yes     Comment: social   • Drug use: No   • Sexual activity: Defer       Most Recent Immunizations   Administered Date(s) Administered   • Flu Mist 2016       Review of Systems:   Review of Systems   Constitutional: Negative.    HENT: Negative.    Eyes: Negative.    Respiratory: Negative.    Cardiovascular: Negative.    Gastrointestinal: Negative.    Endocrine: Negative.    Genitourinary: Negative.    Musculoskeletal: Negative.    Skin: Negative.    Allergic/Immunologic: Negative.    Neurological: Negative.    Hematological: Negative.    Psychiatric/Behavioral: Negative.          Physical Exam   Constitutional: She is oriented to person, place, and time. She appears well-developed and well-nourished.   Cardiovascular:  "Normal rate, regular rhythm and normal heart sounds.   Pulmonary/Chest: Effort normal and breath sounds normal.   Neurological: She is alert and oriented to person, place, and time.   Psychiatric: She has a normal mood and affect. Her behavior is normal. Thought content normal.   Vitals reviewed.        Vital Signs     Vitals:    03/21/19 1716   BP: 143/82   BP Location: Left arm   Patient Position: Sitting   Cuff Size: Small Adult   Pulse: 80   Temp: 97.4 °F (36.3 °C)   TempSrc: Oral   SpO2: 100%   Weight: 101 kg (223 lb)   Height: 170.2 cm (67\")          Results Review:      REVIEWED AND DISCUSSED CLINICAL RESULTS WITH PATIENT      Requested Prescriptions      No prescriptions requested or ordered in this encounter         Current Outpatient Medications:   •  HYDROcodone-acetaminophen (NORCO) 7.5-325 MG per tablet, TAKE 1 TAB Q 4 TO 6 HRS, Disp: 180 tablet, Rfl: 0  •  irbesartan (AVAPRO) 75 MG tablet, Take 1 tablet by mouth Every Night., Disp: 30 tablet, Rfl: 5  •  nicotine (NICODERM CQ) 7 MG/24HR patch, Place 1 patch on the skin Daily., Disp: 56 patch, Rfl: 1  •  NICOTINE STEP 1 21 MG/24HR patch, PLACE 1 PATCH ON THE SKIN DAILY., Disp: 14 patch, Rfl: 0  •  NICOTINE STEP 2 14 MG/24HR patch, PLACE 1 PATCH ON THE SKIN DAILY., Disp: 14 patch, Rfl: 0    Procedures          Diagnoses and all orders for this visit:    Episodic cluster headache, not intractable    Anxiety    Periodic headache syndrome, not intractable        There are no Patient Instructions on file for this visit.     Return in about 3 months (around 6/21/2019).    Chava Bridges M.D  03/21/19  6:47 PM          "

## 2019-04-03 DIAGNOSIS — G43.001 MIGRAINE WITHOUT AURA AND WITH STATUS MIGRAINOSUS, NOT INTRACTABLE: ICD-10-CM

## 2019-04-03 DIAGNOSIS — G44.039 EPISODIC PAROXYSMAL HEMICRANIA, NOT INTRACTABLE: ICD-10-CM

## 2019-04-03 RX ORDER — HYDROCODONE BITARTRATE AND ACETAMINOPHEN 7.5; 325 MG/1; MG/1
TABLET ORAL
Qty: 180 TABLET | Refills: 0 | Status: SHIPPED | OUTPATIENT
Start: 2019-04-03 | End: 2019-05-07 | Stop reason: SDUPTHER

## 2019-04-19 DIAGNOSIS — G44.039 EPISODIC PAROXYSMAL HEMICRANIA, NOT INTRACTABLE: ICD-10-CM

## 2019-04-19 RX ORDER — PROPRANOLOL HCL 60 MG
CAPSULE, EXTENDED RELEASE 24HR ORAL
Qty: 30 CAPSULE | Refills: 5 | Status: SHIPPED | OUTPATIENT
Start: 2019-04-19 | End: 2020-01-20

## 2019-05-02 ENCOUNTER — OFFICE VISIT (OUTPATIENT)
Dept: INTERNAL MEDICINE | Facility: CLINIC | Age: 39
End: 2019-05-02

## 2019-05-02 VITALS
HEIGHT: 67 IN | HEART RATE: 80 BPM | TEMPERATURE: 98.7 F | BODY MASS INDEX: 35.16 KG/M2 | WEIGHT: 224 LBS | OXYGEN SATURATION: 100 % | SYSTOLIC BLOOD PRESSURE: 139 MMHG | DIASTOLIC BLOOD PRESSURE: 90 MMHG | RESPIRATION RATE: 16 BRPM

## 2019-05-02 DIAGNOSIS — L73.9 FOLLICULITIS: Primary | ICD-10-CM

## 2019-05-02 DIAGNOSIS — B37.31 VAGINAL CANDIDIASIS: ICD-10-CM

## 2019-05-02 PROCEDURE — 99213 OFFICE O/P EST LOW 20 MIN: CPT | Performed by: NURSE PRACTITIONER

## 2019-05-02 RX ORDER — FLUCONAZOLE 150 MG/1
150 TABLET ORAL ONCE
Qty: 1 TABLET | Refills: 0 | Status: SHIPPED | OUTPATIENT
Start: 2019-05-02 | End: 2019-05-02

## 2019-05-02 RX ORDER — SULFAMETHOXAZOLE AND TRIMETHOPRIM 800; 160 MG/1; MG/1
1 TABLET ORAL 2 TIMES DAILY
Qty: 20 TABLET | Refills: 0 | Status: SHIPPED | OUTPATIENT
Start: 2019-05-02 | End: 2019-05-12

## 2019-05-02 RX ORDER — FERROUS SULFATE TAB EC 324 MG (65 MG FE EQUIVALENT) 324 (65 FE) MG
324 TABLET DELAYED RESPONSE ORAL
COMMUNITY
End: 2020-01-24 | Stop reason: SDUPTHER

## 2019-05-02 RX ORDER — VALACYCLOVIR HYDROCHLORIDE 500 MG/1
TABLET, FILM COATED ORAL
Refills: 3 | COMMUNITY
Start: 2019-04-19

## 2019-05-02 RX ORDER — FLUTICASONE PROPIONATE 0.05 %
CREAM (GRAM) TOPICAL
Refills: 0 | COMMUNITY
Start: 2019-04-03 | End: 2019-07-31

## 2019-05-02 NOTE — PROGRESS NOTES
"Subjective   Martha Diaz is a 38 y.o. female.   Chief Complaint   Patient presents with   • Skin Issue       Patient presents for evaluation of \"bump\" on her face that will not heal.  She noticed it first 1 week ago and scratched it, and ever since it has had crusting and has not healed.  She has had some purulent drainage from it.  She has had no surrounding heat or erythema.  She denies fever, chills, shortness of breath, chest discomfort.  She has been putting some triple antibiotic ointment on it which does not seem to have helped.  She denies development of any other new issues today.         The following portions of the patient's history were reviewed and updated as appropriate: allergies, current medications, past family history, past medical history, past social history, past surgical history and problem list.    Review of Systems   Constitutional: Negative for activity change, chills, fatigue, fever, unexpected weight gain and unexpected weight loss.   HENT: Negative for congestion, hearing loss, postnasal drip, sinus pressure, sneezing, sore throat and tinnitus.    Eyes: Negative for photophobia, pain and visual disturbance.   Respiratory: Negative for cough, chest tightness, shortness of breath and wheezing.    Cardiovascular: Negative for chest pain, palpitations and leg swelling.   Gastrointestinal: Negative for abdominal distention, abdominal pain, constipation, diarrhea, nausea and vomiting.   Endocrine: Negative for polydipsia, polyphagia and polyuria.   Genitourinary: Negative for dysuria, frequency, hematuria and urgency.   Skin: Positive for skin lesions.   Neurological: Negative for dizziness, weakness, numbness and headache.   All other systems reviewed and are negative.      Objective    /90 (BP Location: Left arm, Patient Position: Sitting, Cuff Size: Adult)   Pulse 80   Temp 98.7 °F (37.1 °C) (Oral)   Resp 16   Ht 170.2 cm (67\")   Wt 102 kg (224 lb)   SpO2 100%   BMI 35.08 " kg/m²     Physical Exam   Constitutional: She is oriented to person, place, and time. She appears well-developed and well-nourished. No distress.   HENT:   Head: Atraumatic.   Eyes: Pupils are equal, round, and reactive to light.   Neck: Normal range of motion. Neck supple.   Cardiovascular: Normal rate and regular rhythm.   Pulmonary/Chest: Effort normal and breath sounds normal.   Musculoskeletal: Normal range of motion.   Neurological: She is alert and oriented to person, place, and time.   Skin: Skin is warm and dry. Capillary refill takes less than 2 seconds. She is not diaphoretic.   Papule to left cheek, 1 cm, rounded, no surrounding erythema.  No purulent drainage.  Slight crusting around edges.   Psychiatric: She has a normal mood and affect. Her behavior is normal. Judgment and thought content normal.   Nursing note and vitals reviewed.    Current outpatient and discharge medications have been reconciled for the patient.  Reviewed by: HELENA Light      Assessment/Plan   Martha was seen today for skin issue.    Diagnoses and all orders for this visit:    Folliculitis  -     sulfamethoxazole-trimethoprim (BACTRIM DS) 800-160 MG per tablet; Take 1 tablet by mouth 2 (Two) Times a Day for 10 days.  -     mupirocin (BACTROBAN) 2 % ointment; Apply  topically to the appropriate area as directed 3 (Three) Times a Day.    Vaginal candidiasis  -     fluconazole (DIFLUCAN) 150 MG tablet; Take 1 tablet by mouth 1 (One) Time for 1 dose.      -We will treat folliculitis with Bactrim DS x10 days as well as mupirocin.  Also provided 1 dose of Diflucan as she states she always gets a yeast infection with antibiotics.    -Follow-up if symptoms persist or worsen.  Follow-up routinely with PCP, Dr. Bridges.

## 2019-05-07 DIAGNOSIS — G43.001 MIGRAINE WITHOUT AURA AND WITH STATUS MIGRAINOSUS, NOT INTRACTABLE: ICD-10-CM

## 2019-05-07 DIAGNOSIS — G44.039 EPISODIC PAROXYSMAL HEMICRANIA, NOT INTRACTABLE: ICD-10-CM

## 2019-05-07 RX ORDER — HYDROCODONE BITARTRATE AND ACETAMINOPHEN 7.5; 325 MG/1; MG/1
TABLET ORAL
Qty: 180 TABLET | Refills: 0 | Status: SHIPPED | OUTPATIENT
Start: 2019-05-07 | End: 2019-06-06 | Stop reason: SDUPTHER

## 2019-05-13 ENCOUNTER — OFFICE VISIT (OUTPATIENT)
Dept: INTERNAL MEDICINE | Facility: CLINIC | Age: 39
End: 2019-05-13

## 2019-05-13 VITALS
WEIGHT: 222 LBS | BODY MASS INDEX: 34.84 KG/M2 | HEIGHT: 67 IN | SYSTOLIC BLOOD PRESSURE: 124 MMHG | TEMPERATURE: 97.1 F | OXYGEN SATURATION: 100 % | HEART RATE: 95 BPM | DIASTOLIC BLOOD PRESSURE: 83 MMHG

## 2019-05-13 DIAGNOSIS — L98.9 SKIN LESION OF FACE: Primary | ICD-10-CM

## 2019-05-13 PROCEDURE — 99213 OFFICE O/P EST LOW 20 MIN: CPT | Performed by: NURSE PRACTITIONER

## 2019-05-13 RX ORDER — FLUCONAZOLE 150 MG/1
150 TABLET ORAL ONCE
Refills: 0 | COMMUNITY
Start: 2019-05-02 | End: 2019-07-10

## 2019-05-13 RX ORDER — DOXYCYCLINE HYCLATE 100 MG/1
100 CAPSULE ORAL 2 TIMES DAILY
Qty: 60 CAPSULE | Refills: 0 | Status: SHIPPED | OUTPATIENT
Start: 2019-05-13 | End: 2019-07-10

## 2019-05-13 NOTE — PROGRESS NOTES
Subjective   Martha Diaz is a 38 y.o. female.   Chief Complaint   Patient presents with   • rash on face     c/c pt states rash on face       Patient presents for evaluation of facial skin lesion.  This is a 38-year-old female patient of Dr. Bridges.  She was seen by me on 5/2/2019 and treated for left cheek folliculitis with mupirocin ointment 3 times daily and Bactrim DS x10 days.  She completed the course of Bactrim DS and full but states that she is still seeing some drainage from the site.  She states that it fluctuates throughout the day and at times will drain pus, but then will scab over at times.  She is not having much pain in the area but states that lesion worries her because it is not completely healing up and is in a very noticeable area.  She does not think it has gotten any bigger, but states that it has not gone away.  She denies fever, chills, shortness of breath, chest discomfort or any trouble swallowing or chewing.  She denies development of any other new issues today.         The following portions of the patient's history were reviewed and updated as appropriate: allergies, current medications, past family history, past medical history, past social history, past surgical history and problem list.    Review of Systems   Constitutional: Negative for activity change, chills, fatigue, fever, unexpected weight gain and unexpected weight loss.   HENT: Negative for congestion, hearing loss, postnasal drip, sinus pressure, sneezing, sore throat and tinnitus.    Eyes: Negative for photophobia, pain and visual disturbance.   Respiratory: Negative for cough, chest tightness, shortness of breath and wheezing.    Cardiovascular: Negative for chest pain, palpitations and leg swelling.   Gastrointestinal: Negative for abdominal distention, abdominal pain, constipation, diarrhea, nausea and vomiting.   Endocrine: Negative for polydipsia, polyphagia and polyuria.   Genitourinary: Negative for dysuria,  "frequency, hematuria and urgency.   Skin: Positive for skin lesions.   Neurological: Negative for dizziness, weakness, numbness and headache.   All other systems reviewed and are negative.      Objective    /83 (BP Location: Left arm, Patient Position: Sitting, Cuff Size: Large Adult)   Pulse 95   Temp 97.1 °F (36.2 °C) (Oral)   Ht 170.2 cm (67\")   Wt 101 kg (222 lb)   SpO2 100%   BMI 34.77 kg/m²     Physical Exam   Constitutional: She is oriented to person, place, and time. She appears well-developed and well-nourished.   HENT:   Head: Atraumatic.   Eyes: Pupils are equal, round, and reactive to light.   Neck: Normal range of motion. Neck supple.   Musculoskeletal: Normal range of motion.   Neurological: She is alert and oriented to person, place, and time.   Left lower cheek folliculitis, no induration or erythema surrounding.  No drainage.  Crusting throughout.   Skin: Skin is warm and dry. Capillary refill takes less than 2 seconds. No erythema.   Psychiatric: She has a normal mood and affect. Her behavior is normal. Judgment and thought content normal.   Nursing note and vitals reviewed.    Current outpatient and discharge medications have been reconciled for the patient.  Reviewed by: HELENA Light      Assessment/Plan   Martha was seen today for rash on face.    Diagnoses and all orders for this visit:    Skin lesion of face  -     Ambulatory Referral to Dermatology  -     doxycycline (VIBRAMYCIN) 100 MG capsule; Take 1 capsule by mouth 2 (Two) Times a Day.    -Left cheek skin lesion, folliculitis: She has completed the Bactrim.  Consulted with Dr. rhodes on this dermatologic issue.  We will get her to dermatology urgently.  We will treat with doxycycline 100 mg twice daily x 30 days or until she sees dermatology.  Also asked her to continue the mupirocin 3 times daily.  She will also cleanse the area with antibacterial soap nightly and completely dry.    -She will start a probiotic in the " meantime while she is on the antibiotic.    -Follow up if symptoms persist or worsen.  Follow-up with dermatology referral.  Follow-up PRN and routinely with PCP, Dr. Bridges.

## 2019-06-06 DIAGNOSIS — G44.039 EPISODIC PAROXYSMAL HEMICRANIA, NOT INTRACTABLE: ICD-10-CM

## 2019-06-06 DIAGNOSIS — G43.001 MIGRAINE WITHOUT AURA AND WITH STATUS MIGRAINOSUS, NOT INTRACTABLE: ICD-10-CM

## 2019-06-06 RX ORDER — HYDROCODONE BITARTRATE AND ACETAMINOPHEN 7.5; 325 MG/1; MG/1
TABLET ORAL
Qty: 180 TABLET | Refills: 0 | Status: SHIPPED | OUTPATIENT
Start: 2019-06-06 | End: 2019-07-01 | Stop reason: SDUPTHER

## 2019-06-10 DIAGNOSIS — L98.9 SKIN LESION OF FACE: ICD-10-CM

## 2019-06-10 NOTE — TELEPHONE ENCOUNTER
I thought she said that this did not help, but that the dermatologist I sent her to gave her something that cleared it up?

## 2019-06-11 RX ORDER — DOXYCYCLINE HYCLATE 100 MG/1
CAPSULE ORAL
Qty: 60 CAPSULE | Refills: 0 | OUTPATIENT
Start: 2019-06-11

## 2019-07-01 DIAGNOSIS — G44.039 EPISODIC PAROXYSMAL HEMICRANIA, NOT INTRACTABLE: ICD-10-CM

## 2019-07-01 DIAGNOSIS — G43.001 MIGRAINE WITHOUT AURA AND WITH STATUS MIGRAINOSUS, NOT INTRACTABLE: ICD-10-CM

## 2019-07-02 RX ORDER — HYDROCODONE BITARTRATE AND ACETAMINOPHEN 7.5; 325 MG/1; MG/1
TABLET ORAL
Qty: 180 TABLET | Refills: 0 | Status: SHIPPED | OUTPATIENT
Start: 2019-07-02 | End: 2019-08-01 | Stop reason: SDUPTHER

## 2019-07-10 ENCOUNTER — OFFICE VISIT (OUTPATIENT)
Dept: INTERNAL MEDICINE | Facility: CLINIC | Age: 39
End: 2019-07-10

## 2019-07-10 VITALS
WEIGHT: 229 LBS | BODY MASS INDEX: 35.94 KG/M2 | TEMPERATURE: 98.7 F | HEIGHT: 67 IN | OXYGEN SATURATION: 100 % | HEART RATE: 75 BPM | SYSTOLIC BLOOD PRESSURE: 131 MMHG | DIASTOLIC BLOOD PRESSURE: 90 MMHG

## 2019-07-10 DIAGNOSIS — G43.001 MIGRAINE WITHOUT AURA AND WITH STATUS MIGRAINOSUS, NOT INTRACTABLE: ICD-10-CM

## 2019-07-10 DIAGNOSIS — I10 ESSENTIAL HYPERTENSION: Primary | ICD-10-CM

## 2019-07-10 DIAGNOSIS — F11.90 CHRONIC NARCOTIC USE: ICD-10-CM

## 2019-07-10 DIAGNOSIS — R30.0 DYSURIA: ICD-10-CM

## 2019-07-10 DIAGNOSIS — D50.0 IRON DEFICIENCY ANEMIA DUE TO CHRONIC BLOOD LOSS: ICD-10-CM

## 2019-07-10 PROBLEM — D50.9 IRON DEFICIENCY ANEMIA: Status: ACTIVE | Noted: 2019-07-10

## 2019-07-10 PROCEDURE — 99213 OFFICE O/P EST LOW 20 MIN: CPT | Performed by: NURSE PRACTITIONER

## 2019-07-10 NOTE — ASSESSMENT & PLAN NOTE
Hypertension is improving with treatment.  Continue current treatment regimen.  Dietary sodium restriction.  Weight loss.  Regular aerobic exercise.  Stop smoking.  Blood pressure will be reassessed in 3 months.

## 2019-07-10 NOTE — ASSESSMENT & PLAN NOTE
Headaches are unchanged.  Continue current treatment regimen.  Counseled regarding lifestyle modifications.   Quit smoking, more exercise.

## 2019-07-10 NOTE — PROGRESS NOTES
Name: Martha Diaz  :  1980    Subjective:      Chief Complaint   Patient presents with   • Medication follow up     c/c pt states follow up on medication   FU: HTN, Migraines       Martha Diaz is a 38 y.o. female patient of Chava Bridges MD who is here for follow up of Hypertension, migraines. She also states she had dysuria for 2 days.  Hx UTIs.  Last seen here, had brown vaginal discharge and resolved with Flagyl.     Hypertension   This is a chronic problem. The current episode started more than 1 year ago. The problem is controlled. Associated symptoms include headaches and neck pain. Pertinent negatives include no blurred vision, palpitations, peripheral edema or shortness of breath. Associated agents: smoking. Risk factors for coronary artery disease include stress and smoking/tobacco exposure. Past treatments include beta blockers and angiotensin blockers. Current antihypertension treatment includes beta blockers and angiotensin blockers. The current treatment provides moderate improvement. Compliance problems include exercise.    Migraine    This is a chronic problem. The current episode started more than 1 year ago. The problem occurs intermittently. The problem has been unchanged. The pain is located in the frontal region. The pain does not radiate. The pain quality is similar to prior headaches. The quality of the pain is described as aching. The pain is at a severity of 5/10. The pain is moderate. Associated symptoms include neck pain and photophobia. Pertinent negatives include no blurred vision. The symptoms are aggravated by emotional stress. She has tried acetaminophen, oral narcotics and triptans for the symptoms. The treatment provided moderate relief. Her past medical history is significant for hypertension.   Anemia   Presents for follow-up visit. There has been no bruising/bleeding easily or palpitations. Signs of blood loss that are present include menorrhagia.  "(Followed by GYN)   Compliance problems include medication side effects.  Side effects of medications include GI discomfort (constipation).     Current smoker.  Has tried nicotine but did not stick with it.  States she'll consider smoking cessation program.     She had had a \"bump\" around Left of mouth.  Tx with doxycycline.  She had follow up with dermatology and bx. States showed was reaction to bug bite. Improved now.     I have reviewed the patient's medical history in detail and updated the computerized patient record.    Past Medical History:   Diagnosis Date   • Anxiety    • Bump     on the wrist   • Ganglion cyst of wrist     R wrist   • Headache    • Migraine    • Right wrist pain    • Stress        Past Surgical History:   Procedure Laterality Date   •  SECTION     • CHOLECYSTECTOMY         Family History   Problem Relation Age of Onset   • Breast cancer Maternal Aunt    • Stomach cancer Maternal Aunt    • Hypertension Maternal Grandmother    • Thyroid disease Maternal Grandmother    • Hypertension Maternal Grandfather    • Thyroid disease Maternal Grandfather        Social History     Socioeconomic History   • Marital status: Single     Spouse name: Not on file   • Number of children: Not on file   • Years of education: Not on file   • Highest education level: Not on file   Tobacco Use   • Smoking status: Former Smoker     Packs/day: 1.00     Years: 17.00     Pack years: 17.00     Types: Cigarettes   • Smokeless tobacco: Never Used   Substance and Sexual Activity   • Alcohol use: Yes     Comment: social   • Drug use: No   • Sexual activity: Defer       Most Recent Immunizations   Administered Date(s) Administered   • Flu Mist 2016         Review of Systems:   Review of Systems   Eyes: Positive for photophobia. Negative for blurred vision.   Respiratory: Negative for shortness of breath.    Cardiovascular: Negative for palpitations.   Genitourinary: Positive for menorrhagia. " "  Musculoskeletal: Positive for neck pain.   Neurological: Positive for headaches.   Hematological: Does not bruise/bleed easily.         Objective:      Physical Exam:   Physical Exam   Constitutional: She is oriented to person, place, and time. She appears well-developed. She is cooperative.   Cardiovascular: Normal rate, regular rhythm, normal heart sounds, intact distal pulses and normal pulses.   Pulmonary/Chest: Effort normal and breath sounds normal. She exhibits no deformity.   Equal, Unlabored   Abdominal: Soft. Bowel sounds are normal.   Neurological: She is alert and oriented to person, place, and time.   Skin: Capillary refill takes 2 to 3 seconds.   Psychiatric: She has a normal mood and affect.   Vitals reviewed.        Vital Signs:     Vitals:    07/10/19 1637   BP: 131/90   BP Location: Left arm   Patient Position: Sitting   Cuff Size: Large Adult   Pulse: 75   Temp: 98.7 °F (37.1 °C)   TempSrc: Oral   SpO2: 100%   Weight: 104 kg (229 lb)   Height: 170.2 cm (67\")          Results Review:      REVIEWED AND DISCUSSED LAB RESULTS WITH PATIENT  Lab Results   Component Value Date    WBC 6.03 12/10/2018    HGB 11.3 (L) 12/10/2018    HCT 34.0 (L) 12/10/2018    MCV 82.3 12/10/2018     12/10/2018       Requested Prescriptions      No prescriptions requested or ordered in this encounter         Current Outpatient Medications:   •  ferrous sulfate 324 (65 Fe) MG tablet delayed-release EC tablet, Take 324 mg by mouth Daily With Breakfast., Disp: , Rfl:   •  fluticasone (CUTIVATE) 0.05 % cream, APPLY A THIN LAYER TO THE AFFECTED AREAS BY TOPICAL ROUTE TWICE A DAY RUN IN GENTLY AND COMPLETELY, Disp: , Rfl: 0  •  HYDROcodone-acetaminophen (NORCO) 7.5-325 MG per tablet, TAKE 1 TAB Q 4 TO 6 HRS, Disp: 180 tablet, Rfl: 0  •  irbesartan (AVAPRO) 75 MG tablet, Take 1 tablet by mouth Every Night., Disp: 30 tablet, Rfl: 5  •  mupirocin (BACTROBAN) 2 % ointment, Apply  topically to the appropriate area as directed 3 " (Three) Times a Day., Disp: 1 g, Rfl: 1  •  NICOTINE STEP 1 21 MG/24HR patch, PLACE 1 PATCH ON THE SKIN DAILY., Disp: 14 patch, Rfl: 0  •  NICOTINE STEP 2 14 MG/24HR patch, PLACE 1 PATCH ON THE SKIN DAILY., Disp: 14 patch, Rfl: 0  •  propranolol LA (INDERAL LA) 60 MG 24 hr capsule, TAKE 1 CAPSULE BY MOUTH EVERY DAY, Disp: 30 capsule, Rfl: 5  •  valACYclovir (VALTREX) 500 MG tablet, TAKE 1 TABLET (500 MG) BY MOUTH TWICE A DAY FOR 90 DAYS, Disp: , Rfl: 3  •  nicotine (NICODERM CQ) 7 MG/24HR patch, Place 1 patch on the skin Daily., Disp: 56 patch, Rfl: 1       Assessment and Plan:        Problem List Items Addressed This Visit        Cardiovascular and Mediastinum    Essential hypertension - Primary     Hypertension is improving with treatment.  Continue current treatment regimen.  Dietary sodium restriction.  Weight loss.  Regular aerobic exercise.  Stop smoking.  Blood pressure will be reassessed in 3 months.         Migraine     Headaches are unchanged.  Continue current treatment regimen.  Counseled regarding lifestyle modifications.   Quit smoking, more exercise.                 Hematopoietic and Hemostatic    Iron deficiency anemia     Heavy periods   Has been taking ferrous sulfate but has been causing constipation  Will change to ferrosequels if needed           Relevant Orders    CBC (No Diff)    Iron and TIBC    Ferritin       Other    Chronic narcotic use      Other Visit Diagnoses     Dysuria        Relevant Orders    Urinalysis With Culture If Indicated - Urine, Clean Catch             Discussed any change in Rx and discussed visit with patient.  All questions answered.      The patient has read and signed the Psychiatric Controlled Substance Contract.  I will continue to see patient for regular follow up appointments.  They are well controlled on their medication.  SUZANNE is updated every 3 months. The patient is aware of the potential for addiction and dependence.    Return in about 3 months (around  "10/10/2019).    Santiago \"Osito\" Dianne, HELENA   07/10/19    Additional Patient Counseling:       There are no Patient Instructions on file for this visit.  "

## 2019-07-10 NOTE — ASSESSMENT & PLAN NOTE
Heavy periods   Has been taking ferrous sulfate but has been causing constipation  Will change to ferrosequels if needed

## 2019-07-11 LAB
APPEARANCE UR: CLEAR
BACTERIA #/AREA URNS HPF: NORMAL /HPF
BILIRUB UR QL STRIP: NEGATIVE
COLOR UR: YELLOW
EPI CELLS #/AREA URNS HPF: NORMAL /HPF
ERYTHROCYTE [DISTWIDTH] IN BLOOD BY AUTOMATED COUNT: 14.3 % (ref 12.3–15.4)
FERRITIN SERPL-MCNC: 14 NG/ML (ref 13–150)
GLUCOSE UR QL: NEGATIVE
HCT VFR BLD AUTO: 37.6 % (ref 34–46.6)
HGB BLD-MCNC: 12.3 G/DL (ref 12–15.9)
HGB UR QL STRIP: NEGATIVE
IRON SATN MFR SERPL: 9 % (ref 20–50)
IRON SERPL-MCNC: 42 MCG/DL (ref 37–145)
KETONES UR QL STRIP: NEGATIVE
LEUKOCYTE ESTERASE UR QL STRIP: NEGATIVE
MCH RBC QN AUTO: 29.2 PG (ref 26.6–33)
MCHC RBC AUTO-ENTMCNC: 32.7 G/DL (ref 31.5–35.7)
MCV RBC AUTO: 89.3 FL (ref 79–97)
MICRO URNS: NORMAL
MICRO URNS: NORMAL
MUCOUS THREADS URNS QL MICRO: PRESENT /HPF
NITRITE UR QL STRIP: NEGATIVE
PH UR STRIP: 6.5 [PH] (ref 5–7.5)
PLATELET # BLD AUTO: 186 10*3/MM3 (ref 140–450)
PROT UR QL STRIP: NEGATIVE
RBC # BLD AUTO: 4.21 10*6/MM3 (ref 3.77–5.28)
RBC #/AREA URNS HPF: NORMAL /HPF
SP GR UR: 1.01 (ref 1–1.03)
TIBC SERPL-MCNC: 486 MCG/DL
UIBC SERPL-MCNC: 444 MCG/DL (ref 112–346)
URINALYSIS REFLEX: NORMAL
UROBILINOGEN UR STRIP-MCNC: 0.2 MG/DL (ref 0.2–1)
WBC # BLD AUTO: 6.25 10*3/MM3 (ref 3.4–10.8)
WBC #/AREA URNS HPF: NORMAL /HPF

## 2019-07-11 NOTE — PROGRESS NOTES
Contact patient:   Her hemoglobin has improved.    She does need to still supplement with Iron on Monday, Wednesday and Friday.   Gloria-sequels with the brand that I had discussed with her that was better for those with constipation. She can get this at local pharmacy or online at [facility].     Smoking cessation: She can contact [facility].  [phone]  The provider is Joelle Daniel.   If she needs a referral, please let us know and I will send one.

## 2019-07-31 ENCOUNTER — OFFICE VISIT (OUTPATIENT)
Dept: INTERNAL MEDICINE | Facility: CLINIC | Age: 39
End: 2019-07-31

## 2019-07-31 VITALS
SYSTOLIC BLOOD PRESSURE: 123 MMHG | HEART RATE: 93 BPM | OXYGEN SATURATION: 99 % | BODY MASS INDEX: 35.72 KG/M2 | HEIGHT: 67 IN | WEIGHT: 227.6 LBS | DIASTOLIC BLOOD PRESSURE: 88 MMHG | TEMPERATURE: 98.8 F

## 2019-07-31 DIAGNOSIS — J30.9 ALLERGIC CONJUNCTIVITIS OF BOTH EYES AND RHINITIS: ICD-10-CM

## 2019-07-31 DIAGNOSIS — J30.1 SEASONAL ALLERGIC RHINITIS DUE TO POLLEN: Primary | ICD-10-CM

## 2019-07-31 DIAGNOSIS — H10.13 ALLERGIC CONJUNCTIVITIS OF BOTH EYES AND RHINITIS: ICD-10-CM

## 2019-07-31 PROCEDURE — 99212 OFFICE O/P EST SF 10 MIN: CPT | Performed by: NURSE PRACTITIONER

## 2019-07-31 NOTE — PROGRESS NOTES
Name: Martha Diaz  :  1980    Subjective:      CC: Allergies    Martha Diaz is a 38 y.o. female patient of Chava Bridges MD who is here for allergies.     She has history of seasonal allergies.  States over the last few days it has gotten worse.  Sneezing, itchy eye, runny nose.  No fever, cough, soa, CP.     Has used allegra in the past and not effective.  Also has used Astelin but insurance will no longer over.     She continues to smoke.       I have reviewed the patient's medical history in detail and updated the computerized patient record.    Past Medical History:   Diagnosis Date   • Anxiety    • Bump     on the wrist   • Ganglion cyst of wrist     R wrist   • Headache    • Migraine    • Right wrist pain    • Stress        Past Surgical History:   Procedure Laterality Date   •  SECTION     • CHOLECYSTECTOMY         Family History   Problem Relation Age of Onset   • Breast cancer Maternal Aunt    • Stomach cancer Maternal Aunt    • Hypertension Maternal Grandmother    • Thyroid disease Maternal Grandmother    • Hypertension Maternal Grandfather    • Thyroid disease Maternal Grandfather        Social History     Socioeconomic History   • Marital status: Single     Spouse name: Not on file   • Number of children: Not on file   • Years of education: Not on file   • Highest education level: Not on file   Tobacco Use   • Smoking status: Former Smoker     Packs/day: 1.00     Years: 17.00     Pack years: 17.00     Types: Cigarettes   • Smokeless tobacco: Never Used   Substance and Sexual Activity   • Alcohol use: Yes     Comment: social   • Drug use: No   • Sexual activity: Defer       Most Recent Immunizations   Administered Date(s) Administered   • Flu Mist 2016         Review of Systems:   Review of Systems   Constitutional: Negative for fever.   HENT: Positive for congestion, rhinorrhea and sneezing. Negative for ear pain, nosebleeds, postnasal drip, sinus  "pain and sore throat.    Respiratory: Negative for cough and shortness of breath.    Cardiovascular: Negative for chest pain.         Objective:      Physical Exam:   Physical Exam   Constitutional: She is oriented to person, place, and time. She appears well-developed. She is cooperative.   HENT:   Mouth/Throat: Oropharynx is clear and moist. No oropharyngeal exudate.   Turbinates pale/boggy    Eyes:   Conj injected   Cardiovascular: Normal rate, regular rhythm, normal heart sounds, intact distal pulses and normal pulses.   Pulmonary/Chest: Effort normal and breath sounds normal. She exhibits no deformity.   Equal, Unlabored   Abdominal: Soft. Bowel sounds are normal.   Lymphadenopathy:     She has no cervical adenopathy.   Neurological: She is alert and oriented to person, place, and time.   Skin: Capillary refill takes 2 to 3 seconds.   Psychiatric: She has a normal mood and affect.   Vitals reviewed.        Vital Signs:     Vitals:    07/31/19 1625   BP: 123/88   BP Location: Left arm   Patient Position: Sitting   Cuff Size: Large Adult   Pulse: 93   Temp: 98.8 °F (37.1 °C)   TempSrc: Oral   SpO2: 99%   Weight: 103 kg (227 lb 9.6 oz)   Height: 170.2 cm (67\")                Requested Prescriptions      No prescriptions requested or ordered in this encounter         Current Outpatient Medications:   •  ferrous sulfate 324 (65 Fe) MG tablet delayed-release EC tablet, Take 324 mg by mouth Daily With Breakfast., Disp: , Rfl:   •  HYDROcodone-acetaminophen (NORCO) 7.5-325 MG per tablet, TAKE 1 TAB Q 4 TO 6 HRS, Disp: 180 tablet, Rfl: 0  •  irbesartan (AVAPRO) 75 MG tablet, Take 1 tablet by mouth Every Night., Disp: 30 tablet, Rfl: 5  •  propranolol LA (INDERAL LA) 60 MG 24 hr capsule, TAKE 1 CAPSULE BY MOUTH EVERY DAY, Disp: 30 capsule, Rfl: 5  •  valACYclovir (VALTREX) 500 MG tablet, TAKE 1 TABLET (500 MG) BY MOUTH TWICE A DAY FOR 90 DAYS, Disp: , Rfl: 3       Assessment and Plan:        Problem List Items Addressed " "This Visit        Respiratory    Seasonal allergic rhinitis due to pollen - Primary      Other Visit Diagnoses     Allergic conjunctivitis of both eyes and rhinitis            See patient instructions:     She will call if doesn't work and wants another rx sent in.     Discussed any change in Rx and discussed visit with patient.  All questions answered.      Return if symptoms worsen or fail to improve.    Santiago \"Osito\" Dianne, APRN   07/31/19    Additional Patient Counseling:       Patient Instructions   Gave samples of Xyzal - take one daily       And Nasacort - one spray to each nostril twice daily.      Zaditor eye drops over the counter ... Look for generic bottle.     "

## 2019-07-31 NOTE — PATIENT INSTRUCTIONS
Gave samples of Xyzal - take one daily       And Nasacort - one spray to each nostril twice daily.      Zaditor eye drops over the counter ... Look for generic bottle.

## 2019-08-01 DIAGNOSIS — G43.001 MIGRAINE WITHOUT AURA AND WITH STATUS MIGRAINOSUS, NOT INTRACTABLE: ICD-10-CM

## 2019-08-01 DIAGNOSIS — G44.039 EPISODIC PAROXYSMAL HEMICRANIA, NOT INTRACTABLE: ICD-10-CM

## 2019-08-01 RX ORDER — HYDROCODONE BITARTRATE AND ACETAMINOPHEN 7.5; 325 MG/1; MG/1
TABLET ORAL
Qty: 180 TABLET | Refills: 0 | Status: SHIPPED | OUTPATIENT
Start: 2019-08-01 | End: 2019-08-29 | Stop reason: SDUPTHER

## 2019-08-07 ENCOUNTER — TELEPHONE (OUTPATIENT)
Dept: INTERNAL MEDICINE | Facility: CLINIC | Age: 39
End: 2019-08-07

## 2019-08-08 RX ORDER — AZELASTINE HYDROCHLORIDE, FLUTICASONE PROPIONATE 137; 50 UG/1; UG/1
1 SPRAY, METERED NASAL 2 TIMES DAILY
Qty: 1 BOTTLE | Refills: 1 | Status: SHIPPED | OUTPATIENT
Start: 2019-08-08 | End: 2020-01-02 | Stop reason: SDUPTHER

## 2019-08-29 DIAGNOSIS — G43.001 MIGRAINE WITHOUT AURA AND WITH STATUS MIGRAINOSUS, NOT INTRACTABLE: ICD-10-CM

## 2019-08-29 DIAGNOSIS — G44.039 EPISODIC PAROXYSMAL HEMICRANIA, NOT INTRACTABLE: ICD-10-CM

## 2019-08-29 RX ORDER — HYDROCODONE BITARTRATE AND ACETAMINOPHEN 7.5; 325 MG/1; MG/1
TABLET ORAL
Qty: 180 TABLET | Refills: 0 | Status: SHIPPED | OUTPATIENT
Start: 2019-08-29 | End: 2019-09-26 | Stop reason: SDUPTHER

## 2019-09-12 DIAGNOSIS — L73.9 FOLLICULITIS: ICD-10-CM

## 2019-09-26 DIAGNOSIS — G43.001 MIGRAINE WITHOUT AURA AND WITH STATUS MIGRAINOSUS, NOT INTRACTABLE: ICD-10-CM

## 2019-09-26 DIAGNOSIS — G44.039 EPISODIC PAROXYSMAL HEMICRANIA, NOT INTRACTABLE: ICD-10-CM

## 2019-09-26 RX ORDER — HYDROCODONE BITARTRATE AND ACETAMINOPHEN 7.5; 325 MG/1; MG/1
TABLET ORAL
Qty: 180 TABLET | Refills: 0 | Status: SHIPPED | OUTPATIENT
Start: 2019-09-26 | End: 2019-10-29 | Stop reason: SDUPTHER

## 2019-10-02 ENCOUNTER — OFFICE VISIT (OUTPATIENT)
Dept: INTERNAL MEDICINE | Facility: CLINIC | Age: 39
End: 2019-10-02

## 2019-10-02 ENCOUNTER — HOSPITAL ENCOUNTER (OUTPATIENT)
Dept: GENERAL RADIOLOGY | Facility: HOSPITAL | Age: 39
Discharge: HOME | End: 2019-10-02
Admitting: NURSE PRACTITIONER

## 2019-10-02 ENCOUNTER — HOSPITAL ENCOUNTER (OUTPATIENT)
Dept: GENERAL RADIOLOGY | Facility: HOSPITAL | Age: 39
Discharge: HOME | End: 2019-10-02

## 2019-10-02 VITALS
OXYGEN SATURATION: 100 % | RESPIRATION RATE: 16 BRPM | WEIGHT: 224.6 LBS | BODY MASS INDEX: 35.25 KG/M2 | HEART RATE: 87 BPM | DIASTOLIC BLOOD PRESSURE: 91 MMHG | TEMPERATURE: 98.1 F | HEIGHT: 67 IN | SYSTOLIC BLOOD PRESSURE: 138 MMHG

## 2019-10-02 DIAGNOSIS — G44.309 POST-TRAUMATIC HEADACHE, NOT INTRACTABLE, UNSPECIFIED CHRONICITY PATTERN: ICD-10-CM
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
## 2019-10-02 DIAGNOSIS — M25.511 ACUTE PAIN OF RIGHT SHOULDER: ICD-10-CM

## 2019-10-02 DIAGNOSIS — M54.2 NECK PAIN: ICD-10-CM

## 2019-10-02 DIAGNOSIS — Z09 HOSPITAL DISCHARGE FOLLOW-UP: Primary | ICD-10-CM

## 2019-10-02 PROCEDURE — 99214 OFFICE O/P EST MOD 30 MIN: CPT | Performed by: NURSE PRACTITIONER

## 2019-10-02 PROCEDURE — 73030 X-RAY EXAM OF SHOULDER: CPT

## 2019-10-02 PROCEDURE — 72040 X-RAY EXAM NECK SPINE 2-3 VW: CPT

## 2019-10-02 RX ORDER — CYCLOBENZAPRINE HCL 5 MG
TABLET ORAL
Refills: 0 | COMMUNITY
Start: 2019-09-27 | End: 2020-01-24
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
## 2019-10-04 ENCOUNTER — TELEPHONE (OUTPATIENT)
Dept: INTERNAL MEDICINE | Facility: CLINIC | Age: 39
End: 2019-10-04

## 2019-10-04 DIAGNOSIS — M25.511 ACUTE PAIN OF RIGHT SHOULDER: ICD-10-CM

## 2019-10-04 DIAGNOSIS — M54.2 NECK PAIN: ICD-10-CM
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
## 2019-10-04 NOTE — PROGRESS NOTES
Please notify patient that her x-ray of the cervical spine and right shoulder showed some mild degenerative changes but no acute fractures or abnormalities.  Please continue with anti-inflammatories and heat on her neck and shoulder and let me know if symptoms persist or worsen at which time we can always get an MRI if we need.  I will get her the results of the head CT as soon as I have them.

## 2019-10-04 NOTE — TELEPHONE ENCOUNTER
Pt called and stated that she got dizzy while she was on her lunch. She is also waiting on her CT scan to be scheduled. Pt was advised to take the day off and she will be given a Work letter if needed to be excused for a few days. Pt advised as well if she has any visual changes, continued dizziness, and lose of consciousness, or feels like she is losing control of her body then she needs to go to the ER. Pt agreed.

## 2019-10-07 ENCOUNTER — TELEPHONE (OUTPATIENT)
Dept: INTERNAL MEDICINE | Facility: CLINIC | Age: 39
End: 2019-10-07

## 2019-10-07 RX ORDER — METHYLPREDNISOLONE 4 MG/1
TABLET ORAL
Qty: 21 EACH | Refills: 0 | Status: SHIPPED | OUTPATIENT
Start: 2019-10-07 | End: 2019-10-22

## 2019-10-07 NOTE — TELEPHONE ENCOUNTER
Okay to send her a note for today only. Please send in a medrol dosepack to help with pain and inflammation and ask Delmy to type up the work note and I will sign.

## 2019-10-07 NOTE — TELEPHONE ENCOUNTER
Pt called and stated she didn't go to work today due her pain. She would like another Doctors note. Please advise?

## 2019-10-09 ENCOUNTER — HOSPITAL ENCOUNTER (OUTPATIENT)
Dept: CT IMAGING | Facility: HOSPITAL | Age: 39
Discharge: HOME | End: 2019-10-09
Admitting: NURSE PRACTITIONER
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
## 2019-10-09 DIAGNOSIS — G44.309 POST-TRAUMATIC HEADACHE, NOT INTRACTABLE, UNSPECIFIED CHRONICITY PATTERN: ICD-10-CM

## 2019-10-09 PROCEDURE — 70450 CT HEAD/BRAIN W/O DYE: CPT

## 2019-10-09 NOTE — PROGRESS NOTES
Please notify patient that her CT of the head is showing no evidence of intracranial abnormality, fracture or hemorrhage.  I will get her the results of the MRIs as soon as I have them.

## 2019-10-22 ENCOUNTER — RESULTS ENCOUNTER (OUTPATIENT)
Dept: INTERNAL MEDICINE | Facility: CLINIC | Age: 39
End: 2019-10-22

## 2019-10-22 ENCOUNTER — APPOINTMENT (OUTPATIENT)
Dept: MRI IMAGING | Facility: HOSPITAL | Age: 39
End: 2019-10-22

## 2019-10-22 ENCOUNTER — OFFICE VISIT (OUTPATIENT)
Dept: INTERNAL MEDICINE | Facility: CLINIC | Age: 39
End: 2019-10-22

## 2019-10-22 VITALS
SYSTOLIC BLOOD PRESSURE: 130 MMHG | HEART RATE: 85 BPM | WEIGHT: 224.2 LBS | BODY MASS INDEX: 35.19 KG/M2 | RESPIRATION RATE: 16 BRPM | HEIGHT: 67 IN | TEMPERATURE: 97.2 F | OXYGEN SATURATION: 100 % | DIASTOLIC BLOOD PRESSURE: 92 MMHG

## 2019-10-22 DIAGNOSIS — D50.8 IRON DEFICIENCY ANEMIA SECONDARY TO INADEQUATE DIETARY IRON INTAKE: Primary | ICD-10-CM

## 2019-10-22 DIAGNOSIS — N30.00 ACUTE CYSTITIS WITHOUT HEMATURIA: ICD-10-CM

## 2019-10-22 DIAGNOSIS — G44.011 INTRACTABLE EPISODIC CLUSTER HEADACHE: ICD-10-CM

## 2019-10-22 LAB
BILIRUB BLD-MCNC: NEGATIVE MG/DL
CLARITY, POC: CLEAR
COLOR UR: NORMAL
GLUCOSE UR STRIP-MCNC: NEGATIVE MG/DL
KETONES UR QL: NEGATIVE
LEUKOCYTE EST, POC: NEGATIVE
NITRITE UR-MCNC: NEGATIVE MG/ML
PH UR: 6 [PH] (ref 5–8)
PROT UR STRIP-MCNC: NEGATIVE MG/DL
RBC # UR STRIP: NEGATIVE /UL
SP GR UR: 1.02 (ref 1–1.03)
UROBILINOGEN UR QL: NORMAL

## 2019-10-22 PROCEDURE — 99213 OFFICE O/P EST LOW 20 MIN: CPT | Performed by: FAMILY MEDICINE

## 2019-10-22 RX ORDER — METRONIDAZOLE 250 MG/1
250 TABLET ORAL 3 TIMES DAILY
Qty: 30 TABLET | Refills: 0 | Status: SHIPPED | OUTPATIENT
Start: 2019-10-22 | End: 2020-01-24

## 2019-10-22 RX ORDER — DOXYCYCLINE HYCLATE 50 MG/1
324 CAPSULE, GELATIN COATED ORAL
Qty: 30 TABLET | Refills: 5 | Status: SHIPPED | OUTPATIENT
Start: 2019-10-22 | End: 2020-05-22 | Stop reason: SDUPTHER

## 2019-10-22 NOTE — PROGRESS NOTES
CC:chronic HAs,UTI    Subjective.../HPI  Patient present today with1) CYSTITIS- with urgency and frequency- vaginal discharge  2)2-3 norco/day for HA  I have reviewed the patient's medical history in detail and updated the computerized patient record.    Past Medical History:   Diagnosis Date   • Anxiety    • Bump     on the wrist   • Ganglion cyst of wrist     R wrist   • Headache    • Migraine    • Right wrist pain    • Stress        Past Surgical History:   Procedure Laterality Date   •  SECTION     • CHOLECYSTECTOMY         Family History   Problem Relation Age of Onset   • Breast cancer Maternal Aunt    • Stomach cancer Maternal Aunt    • Hypertension Maternal Grandmother    • Thyroid disease Maternal Grandmother    • Hypertension Maternal Grandfather    • Thyroid disease Maternal Grandfather        Social History     Socioeconomic History   • Marital status: Single     Spouse name: Not on file   • Number of children: Not on file   • Years of education: Not on file   • Highest education level: Not on file   Tobacco Use   • Smoking status: Former Smoker     Packs/day: 1.00     Years: 17.00     Pack years: 17.00     Types: Cigarettes   • Smokeless tobacco: Never Used   Substance and Sexual Activity   • Alcohol use: Yes     Comment: social   • Drug use: No   • Sexual activity: Defer       Most Recent Immunizations   Administered Date(s) Administered   • Flu Mist 2016       Review of Systems:   Review of Systems   Constitutional: Negative.    Genitourinary: Positive for dysuria and frequency.         Physical Exam   Constitutional: She is oriented to person, place, and time. She appears well-developed and well-nourished.   Cardiovascular: Normal rate, regular rhythm and normal heart sounds.   Pulmonary/Chest: Effort normal and breath sounds normal.   Abdominal: Soft.   Neurological: She is oriented to person, place, and time.   Psychiatric: She has a normal mood and affect. Her behavior is  "normal.   Vitals reviewed.        Vital Signs     Vitals:    10/22/19 0928   BP: 130/92   BP Location: Left arm   Patient Position: Sitting   Cuff Size: Large Adult   Pulse: 85   Resp: 16   Temp: 97.2 °F (36.2 °C)   TempSrc: Oral   SpO2: 100%   Weight: 102 kg (224 lb 3.2 oz)   Height: 170.2 cm (67\")          Results Review:      REVIEWED AND DISCUSSED CLINICAL RESULTS WITH PATIENT      Requested Prescriptions      No prescriptions requested or ordered in this encounter         Current Outpatient Medications:   •  Azelastine-Fluticasone (DYMISTA) 137-50 MCG/ACT suspension, 1 spray into the nostril(s) as directed by provider 2 (Two) Times a Day., Disp: 1 bottle, Rfl: 1  •  cyclobenzaprine (FLEXERIL) 5 MG tablet, TK 1 T PO TID FOR 3 DAYS, Disp: , Rfl: 0  •  ferrous sulfate 324 (65 Fe) MG tablet delayed-release EC tablet, Take 324 mg by mouth Daily With Breakfast., Disp: , Rfl:   •  HYDROcodone-acetaminophen (NORCO) 7.5-325 MG per tablet, TAKE 1 TAB Q 4 TO 6 HRS, Disp: 180 tablet, Rfl: 0  •  irbesartan (AVAPRO) 75 MG tablet, Take 1 tablet by mouth Every Night., Disp: 30 tablet, Rfl: 5  •  mupirocin (BACTROBAN) 2 % ointment, APPLY TOPICALLY TO THE APPROPRIATE AREA AS DIRECTED 3 (THREE) TIMES A DAY., Disp: 22 g, Rfl: 1  •  propranolol LA (INDERAL LA) 60 MG 24 hr capsule, TAKE 1 CAPSULE BY MOUTH EVERY DAY, Disp: 30 capsule, Rfl: 5  •  valACYclovir (VALTREX) 500 MG tablet, TAKE 1 TABLET (500 MG) BY MOUTH TWICE A DAY FOR 90 DAYS, Disp: , Rfl: 3    Procedures          Diagnoses and all orders for this visit:    Acute cystitis without hematuria  -     POCT urinalysis dipstick, automated  -     Urine Culture - Urine, Urine, Clean Catch; Future    Intractable episodic cluster headache        There are no Patient Instructions on file for this visit.     No Follow-up on file.    Chava Bridges M.D  10/22/19  9:51 AM          "

## 2019-10-24 LAB
BACTERIA UR CULT: NO GROWTH
BACTERIA UR CULT: NORMAL
Lab: NORMAL

## 2019-10-29 DIAGNOSIS — G44.039 EPISODIC PAROXYSMAL HEMICRANIA, NOT INTRACTABLE: ICD-10-CM

## 2019-10-29 DIAGNOSIS — G43.001 MIGRAINE WITHOUT AURA AND WITH STATUS MIGRAINOSUS, NOT INTRACTABLE: ICD-10-CM

## 2019-10-30 RX ORDER — HYDROCODONE BITARTRATE AND ACETAMINOPHEN 7.5; 325 MG/1; MG/1
TABLET ORAL
Qty: 180 TABLET | Refills: 0 | Status: SHIPPED | OUTPATIENT
Start: 2019-10-30 | End: 2019-11-25 | Stop reason: SDUPTHER

## 2019-10-31 ENCOUNTER — HOSPITAL ENCOUNTER (OUTPATIENT)
Dept: MRI IMAGING | Facility: HOSPITAL | Age: 39
Discharge: HOME | End: 2019-10-31
Admitting: NURSE PRACTITIONER

## 2019-10-31 ENCOUNTER — HOSPITAL ENCOUNTER (OUTPATIENT)
Dept: MRI IMAGING | Facility: HOSPITAL | Age: 39
Discharge: HOME | End: 2019-10-31

## 2019-10-31 DIAGNOSIS — M25.511 ACUTE PAIN OF RIGHT SHOULDER: ICD-10-CM
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
## 2019-10-31 DIAGNOSIS — M54.2 NECK PAIN: ICD-10-CM

## 2019-10-31 DIAGNOSIS — S46.811D TRAUMATIC TEAR OF SUPRASPINATUS TENDON OF RIGHT SHOULDER, SUBSEQUENT ENCOUNTER: Primary | ICD-10-CM

## 2019-10-31 PROCEDURE — 72141 MRI NECK SPINE W/O DYE: CPT

## 2019-10-31 PROCEDURE — 73221 MRI JOINT UPR EXTREM W/O DYE: CPT

## 2019-11-01 NOTE — PROGRESS NOTES
Please notify patient that her cervical MRI is showing degenerative changes of her cervical spine.  There is no evidence of spinal cord compression or traumatic injury.  If her neck pain is persisting or worsening we can consult neurosurgery for further evaluation of the pain in conjunction with the chronic changes that her MRI is showing.

## 2019-11-08 ENCOUNTER — TELEPHONE (OUTPATIENT)
Dept: ORTHOPEDIC SURGERY | Facility: CLINIC | Age: 39
End: 2019-11-08

## 2019-11-08 NOTE — TELEPHONE ENCOUNTER
Called [facility] [phone] and she advised me that patients  is withholding her pip benefits and if I send in a claim they will deny.  Also, her medicaid will not pay if her auto will not pay.  I spoke with Mira Carrasco and we decided to call patient and she will be a self pay and pay 100.00 on day of her visit.  I tried calling the patient but had to leave a voice mail for her to return my call/dm

## 2019-11-11 ENCOUNTER — OFFICE VISIT (OUTPATIENT)
Dept: ORTHOPEDIC SURGERY | Facility: CLINIC | Age: 39
End: 2019-11-11

## 2019-11-11 VITALS — WEIGHT: 224 LBS | BODY MASS INDEX: 35.16 KG/M2 | HEIGHT: 67 IN | TEMPERATURE: 97.9 F

## 2019-11-11 DIAGNOSIS — S43.431A TEAR OF RIGHT GLENOID LABRUM, INITIAL ENCOUNTER: ICD-10-CM

## 2019-11-11 DIAGNOSIS — S46.011A TRAUMATIC INCOMPLETE TEAR OF RIGHT ROTATOR CUFF, INITIAL ENCOUNTER: Primary | ICD-10-CM

## 2019-11-11 PROCEDURE — 99203 OFFICE O/P NEW LOW 30 MIN: CPT | Performed by: NURSE PRACTITIONER

## 2019-11-11 RX ORDER — DOXYCYCLINE 100 MG/1
100 CAPSULE ORAL 2 TIMES DAILY
Refills: 1 | COMMUNITY
Start: 2019-10-22 | End: 2020-01-24
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
## 2019-11-19 ENCOUNTER — TREATMENT (OUTPATIENT)
Dept: PHYSICAL THERAPY | Facility: CLINIC | Age: 39
End: 2019-11-19

## 2019-11-19 DIAGNOSIS — R29.3 POOR POSTURE: ICD-10-CM

## 2019-11-19 DIAGNOSIS — G89.29 CHRONIC NECK PAIN: ICD-10-CM

## 2019-11-19 DIAGNOSIS — R29.898 DECREASED ROM OF NECK: ICD-10-CM

## 2019-11-19 DIAGNOSIS — M25.511 CHRONIC PAIN IN RIGHT SHOULDER: Primary | ICD-10-CM

## 2019-11-19 DIAGNOSIS — M25.611 DECREASED ROM OF RIGHT SHOULDER: ICD-10-CM

## 2019-11-19 DIAGNOSIS — M54.2 CHRONIC NECK PAIN: ICD-10-CM

## 2019-11-19 DIAGNOSIS — G89.29 CHRONIC PAIN IN RIGHT SHOULDER: Primary | ICD-10-CM

## 2019-11-19 PROCEDURE — 97110 THERAPEUTIC EXERCISES: CPT | Performed by: PHYSICAL THERAPIST

## 2019-11-19 PROCEDURE — 97162 PT EVAL MOD COMPLEX 30 MIN: CPT | Performed by: PHYSICAL THERAPIST
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
## 2019-11-25 DIAGNOSIS — G44.039 EPISODIC PAROXYSMAL HEMICRANIA, NOT INTRACTABLE: ICD-10-CM

## 2019-11-25 DIAGNOSIS — G43.001 MIGRAINE WITHOUT AURA AND WITH STATUS MIGRAINOSUS, NOT INTRACTABLE: ICD-10-CM

## 2019-11-26 RX ORDER — HYDROCODONE BITARTRATE AND ACETAMINOPHEN 7.5; 325 MG/1; MG/1
TABLET ORAL
Qty: 180 TABLET | Refills: 0 | Status: SHIPPED | OUTPATIENT
Start: 2019-11-26 | End: 2019-12-24 | Stop reason: SDUPTHER

## 2019-12-04 ENCOUNTER — TREATMENT (OUTPATIENT)
Dept: PHYSICAL THERAPY | Facility: CLINIC | Age: 39
End: 2019-12-04

## 2019-12-04 DIAGNOSIS — R29.898 DECREASED ROM OF NECK: ICD-10-CM

## 2019-12-04 DIAGNOSIS — M54.2 CHRONIC NECK PAIN: ICD-10-CM

## 2019-12-04 DIAGNOSIS — G89.29 CHRONIC PAIN IN RIGHT SHOULDER: Primary | ICD-10-CM

## 2019-12-04 DIAGNOSIS — G89.29 CHRONIC NECK PAIN: ICD-10-CM

## 2019-12-04 DIAGNOSIS — M25.511 CHRONIC PAIN IN RIGHT SHOULDER: Primary | ICD-10-CM

## 2019-12-04 DIAGNOSIS — M25.611 DECREASED ROM OF RIGHT SHOULDER: ICD-10-CM

## 2019-12-04 DIAGNOSIS — R29.3 POOR POSTURE: ICD-10-CM

## 2019-12-04 PROCEDURE — 97110 THERAPEUTIC EXERCISES: CPT | Performed by: PHYSICAL THERAPIST

## 2019-12-04 PROCEDURE — 97140 MANUAL THERAPY 1/> REGIONS: CPT | Performed by: PHYSICAL THERAPIST

## 2019-12-04 NOTE — PROGRESS NOTES
Physical Therapy Daily Progress Note    Patient: Martha Diaz   : 1980  Diagnosis/ICD-10 Code:  Chronic pain in right shoulder [M25.511, G89.29]  Referring practitioner: HELENA Farooq  Date of Initial Visit: Type: THERAPY  Noted: 2019  Today's Date: 2019  Patient seen for 2 sessions           Subjective: Pain is located at the superior R shoulder and rates it at a 6/10.  She is noticing greater mobility of the neck since beginning her HEP. Pain at the end of treatment was 3/10.    Objective     Manual therapy:  In supine, legs on bolster, decompression, STM and sub-occipitatl release to the cervical spine.  R shoulder PROM for flexion x 5, 5s hold at point of pain, R GH joint mobilizations, posterior and inferior glides, grades 3-4, 5 x 3 each.  (15 minutes)    Therapeutic exercise  1. Passive R shoulder flexion, hooklying, roll at neck, 30s x 3  2. Craniovertebral, CV, flexion, hooklying, roll at neck, x 20, 2s hold  3. Neck rotation, with CV flexion, hooklying, roll at neck, x 10, B, 2s hold  4. Sleeper stretch, R sidelying, x 10, B, 2s hold  5. Standing row, supinated row, 10 x 2, red theraband    Patient education:  HEP expanded with lying shoulder flexion, lying neck rotation and scapular row, red theraband issued.    Assessment:  Progressed theapeutic exercises and noticed relief after treatment.  She needs cues for exercise technique.    Plan:  Monitor the effect of today's treatment and continue as indicated.       Timed:    Manual Therapy:    15     mins  65738;  Therapeutic Exercise:    30     mins  72714;     Neuromuscular Elizabeth:        mins  79583;    Therapeutic Activity:          mins  48115;     Gait Training:           mins  12769;     Ultrasound:          mins  63366;    Electrical Stimulation:         mins  32293 ( );  Iontophoresis         mins 85308;  Aquatic Therapy         mins 93628;    Untimed:  Electrical Stimulation:         mins  83168 (  );  Mechanical Traction:         mins  94790;     Timed Treatment:   45   mins   Total Treatment:     45   mins  Gold Grubbs, PT  Physical Therapist

## 2019-12-10 ENCOUNTER — TREATMENT (OUTPATIENT)
Dept: PHYSICAL THERAPY | Facility: CLINIC | Age: 39
End: 2019-12-10

## 2019-12-10 DIAGNOSIS — M54.2 CHRONIC NECK PAIN: ICD-10-CM

## 2019-12-10 DIAGNOSIS — G89.29 CHRONIC PAIN IN RIGHT SHOULDER: Primary | ICD-10-CM

## 2019-12-10 DIAGNOSIS — R29.3 POOR POSTURE: ICD-10-CM

## 2019-12-10 DIAGNOSIS — R29.898 DECREASED ROM OF NECK: ICD-10-CM

## 2019-12-10 DIAGNOSIS — M25.511 CHRONIC PAIN IN RIGHT SHOULDER: Primary | ICD-10-CM

## 2019-12-10 DIAGNOSIS — M25.611 DECREASED ROM OF RIGHT SHOULDER: ICD-10-CM

## 2019-12-10 DIAGNOSIS — G89.29 CHRONIC NECK PAIN: ICD-10-CM

## 2019-12-10 PROCEDURE — 97140 MANUAL THERAPY 1/> REGIONS: CPT | Performed by: PHYSICAL THERAPIST

## 2019-12-10 PROCEDURE — 97110 THERAPEUTIC EXERCISES: CPT | Performed by: PHYSICAL THERAPIST

## 2019-12-10 NOTE — PROGRESS NOTES
Physical Therapy Daily Progress Note    Patient: Martha Diaz   : 1980  Diagnosis/ICD-10 Code:  Chronic pain in right shoulder [M25.511, G89.29]  Referring practitioner: HELENA Farooq  Date of Initial Visit: Type: THERAPY  Noted: 2019  Today's Date: 12/10/2019  Patient seen for 3 sessions           Subjective: Martha reported that the R upper/lateral trapezius area is feeling less pain that it was before.  She has not noticed an improvement with function yet.  She has been doing the theraband exercise for 10 repetitions twice per day.      Objective     Manual therapy:  In supine, legs on bolster, decompression, STM and sub-occipitatl release to the cervical spine.  R shoulder PROM for flexion x 5, 5s hold at point of pain, R GH joint mobilizations, inferior glides, grades 3-4, 5 x 3 each.  (12 minutes)    Therapeutic exercise (33 minutes)  1. Shoulder extension, R, with red theraband, 10 x 2  2. Shoulder wall slides x 10, with towel, performed B  3. CV flexion, hooklying, with roll at neck  4. Neck rotation, with CV flexion, hooklying with roll at neck  5. Corner stretch, 30s x 3    Patient education: Shoulder wall slides and corner stretch were added to her HEP.    Assessement:  Martha is progressing with exercises that will improve posture and help to avoid future exacerbations of the neck and shoulder.  She requires cues to perform exercises correctly.  She tolerated treatment well.    Plan:  Monitor the effects of today's treatment and continue as indicated.       Timed:    Manual Therapy:    12     mins  38119;  Therapeutic Exercise:    33     mins  91301;     Neuromuscular Elizabeth:        mins  58371;    Therapeutic Activity:          mins  73355;     Gait Training:           mins  97815;     Ultrasound:          mins  20995;    Electrical Stimulation:         mins  83686 ( );  Iontophoresis         mins 89162;  Aquatic Therapy         mins 97922;    Untimed:  Electrical  Stimulation:         mins  39574 ( );  Mechanical Traction:         mins  80537;     Timed Treatment:   45   mins   Total Treatment:     45   mins  Gold Grubbs PT  Physical Therapist

## 2019-12-11 ENCOUNTER — TREATMENT (OUTPATIENT)
Dept: PHYSICAL THERAPY | Facility: CLINIC | Age: 39
End: 2019-12-11

## 2019-12-11 DIAGNOSIS — M54.2 CHRONIC NECK PAIN: ICD-10-CM

## 2019-12-11 DIAGNOSIS — M25.611 DECREASED ROM OF RIGHT SHOULDER: ICD-10-CM

## 2019-12-11 DIAGNOSIS — M25.511 CHRONIC PAIN IN RIGHT SHOULDER: Primary | ICD-10-CM

## 2019-12-11 DIAGNOSIS — G89.29 CHRONIC NECK PAIN: ICD-10-CM

## 2019-12-11 DIAGNOSIS — R29.898 DECREASED ROM OF NECK: ICD-10-CM

## 2019-12-11 DIAGNOSIS — G89.29 CHRONIC PAIN IN RIGHT SHOULDER: Primary | ICD-10-CM

## 2019-12-11 DIAGNOSIS — R29.3 POOR POSTURE: ICD-10-CM

## 2019-12-11 PROCEDURE — 97140 MANUAL THERAPY 1/> REGIONS: CPT | Performed by: PHYSICAL THERAPIST

## 2019-12-11 PROCEDURE — 97110 THERAPEUTIC EXERCISES: CPT | Performed by: PHYSICAL THERAPIST

## 2019-12-11 NOTE — PROGRESS NOTES
Physical Therapy Daily Progress Note    Patient: Martha Diaz   : 1980  Diagnosis/ICD-10 Code:  Chronic pain in right shoulder [M25.511, G89.29]  Referring practitioner: HELENA Farooq  Date of Initial Visit: Type: THERAPY  Noted: 2019  Today's Date: 2019  Patient seen for 4 sessions           Subjective: Martha reports that she is feeling better today.  She is not observing pain at this moment.     Objective      Manual therapy:  In supine, legs on bolster, decompression, STM and sub-occipitatl release to the cervical spine.  R shoulder PROM for flexion x 5, 5s hold at point of pain, R GH joint mobilizations, posterior and inferior glides, grades 3-4, 5 x 3 each.  (10 minutes)    Therapeutic exercise  1. Passive R shoulder flexion, hooklying, roll at neck, 30s x 3  2. Craniovertebral, CV, flexion, hooklying, roll at neck, x 20, 2s hold  3. Neck rotation, with CV flexion, hooklying, roll at neck, x 10, B, 2s hold  4. Sleeper stretch, R sidelying, x 10, B, 2s hold  5. Standing row, supinated row, 10 x 2, red theraband  6. ER walk out, R shoulder, red theraband, 10 x 2    Patient education:  ER walk out was issued to progress her HEP    Assessment: Martha tolerated treatment well and progressed her exercises that should improve function of the R shoulder with elevation activities.  She required cues for technique.    Plan:  Monitor the effects of today's session and continue as indicated.       Timed:    Manual Therapy:    10     mins  47618;  Therapeutic Exercise:    35     mins  40896;     Neuromuscular Elizabeth:        mins  70241;    Therapeutic Activity:          mins  67519;     Gait Training:           mins  32154;     Ultrasound:          mins  14352;    Electrical Stimulation:         mins  27686 ( );  Iontophoresis         mins 54992;  Aquatic Therapy         mins 65741;    Untimed:  Electrical Stimulation:         mins  67635 ( );  Mechanical Traction:         mins   05532;     Timed Treatment:   45   mins   Total Treatment:     45   mins  Gold Grubbs, PT  Physical Therapist

## 2019-12-18 ENCOUNTER — TREATMENT (OUTPATIENT)
Dept: PHYSICAL THERAPY | Facility: CLINIC | Age: 39
End: 2019-12-18

## 2019-12-18 DIAGNOSIS — R29.898 DECREASED ROM OF NECK: ICD-10-CM

## 2019-12-18 DIAGNOSIS — G89.29 CHRONIC NECK PAIN: ICD-10-CM

## 2019-12-18 DIAGNOSIS — M25.511 CHRONIC PAIN IN RIGHT SHOULDER: Primary | ICD-10-CM

## 2019-12-18 DIAGNOSIS — M25.611 DECREASED ROM OF RIGHT SHOULDER: ICD-10-CM

## 2019-12-18 DIAGNOSIS — G89.29 CHRONIC PAIN IN RIGHT SHOULDER: Primary | ICD-10-CM

## 2019-12-18 DIAGNOSIS — M54.2 CHRONIC NECK PAIN: ICD-10-CM

## 2019-12-18 DIAGNOSIS — R29.3 POOR POSTURE: ICD-10-CM

## 2019-12-18 PROCEDURE — 97110 THERAPEUTIC EXERCISES: CPT | Performed by: PHYSICAL THERAPIST

## 2019-12-18 PROCEDURE — 97140 MANUAL THERAPY 1/> REGIONS: CPT | Performed by: PHYSICAL THERAPIST

## 2019-12-18 NOTE — PROGRESS NOTES
Physical Therapy Daily Progress Note    Patient: Martha Diaz   : 1980  Diagnosis/ICD-10 Code:  Chronic pain in right shoulder [M25.511, G89.29]  Referring practitioner: HELENA Farooq  Date of Initial Visit: Type: THERAPY  Noted: 2019  Today's Date: 2019  Patient seen for 5 sessions           Subjective: Martha reported that her R shoulder area seemed stiff/sore and she felt that it was, at least somewhat, related to the cold weather.     Objective     Manual therapy: STM and decompression to the cervical spine and sub-occipital release (10 minutes)    Therapeutic exercise  1. ER walk out, yellow theraband, 10 x 2, B  2. Scapular row, green theraband, 10 x 2, B using supinated , B  3. Shoulder wall slides, x 10  4. In hooklying, roll at neck, CV flexion x 20  5. In hooklying, roll at neck, neck rotation with CV flexion, x 10, B  6. In hooklying, roll at neck, full flexion, with CV flexion, x 10    Patient education:  Full neck flexion was added to her HEP.    Assessment:  She required cues for all exercises for better technique.  She noticed less pain after today's treatment.    Plan:  Treat and reassess next visit.       Timed:    Manual Therapy:    10     mins  41373;  Therapeutic Exercise:    35     mins  35713;     Neuromuscular Elizabeth:        mins  20308;    Therapeutic Activity:          mins  89655;     Gait Training:           mins  84409;     Ultrasound:          mins  15048;    Electrical Stimulation:         mins  84081 ( );  Iontophoresis         mins 77384;  Aquatic Therapy         mins 76171;    Untimed:  Electrical Stimulation:         mins  93137 ( );  Mechanical Traction:         mins  38782;     Timed Treatment:   45   mins   Total Treatment:     45   mins  Gold Grubbs, PT  Physical Therapist

## 2019-12-20 ENCOUNTER — TREATMENT (OUTPATIENT)
Dept: PHYSICAL THERAPY | Facility: CLINIC | Age: 39
End: 2019-12-20

## 2019-12-20 DIAGNOSIS — M54.2 CHRONIC NECK PAIN: ICD-10-CM

## 2019-12-20 DIAGNOSIS — M25.511 CHRONIC PAIN IN RIGHT SHOULDER: Primary | ICD-10-CM

## 2019-12-20 DIAGNOSIS — G89.29 CHRONIC PAIN IN RIGHT SHOULDER: Primary | ICD-10-CM

## 2019-12-20 DIAGNOSIS — R29.898 DECREASED ROM OF NECK: ICD-10-CM

## 2019-12-20 DIAGNOSIS — G89.29 CHRONIC NECK PAIN: ICD-10-CM

## 2019-12-20 DIAGNOSIS — R29.3 POOR POSTURE: ICD-10-CM

## 2019-12-20 DIAGNOSIS — M25.611 DECREASED ROM OF RIGHT SHOULDER: ICD-10-CM

## 2019-12-20 PROCEDURE — 97110 THERAPEUTIC EXERCISES: CPT | Performed by: PHYSICAL THERAPIST

## 2019-12-20 NOTE — PROGRESS NOTES
Re-Assessment / Re-Certification        Patient: Martha Diaz   : 1980  Diagnosis/ICD-10 Code:  Chronic pain in right shoulder [M25.511, G89.29]  Referring practitioner: HELENA Farooq  Date of Initial Visit: Type: THERAPY  Noted: 2019  Today's Date: 2019  Patient seen for 6 sessions      Subjective:     Clinical Progress: improved  Home Program Compliance: Yes  Treatment has included:  therapeutic exercise, manual therapy, neuro-muscular retraining  and patient education with home exercise program     Subjective: Martha reports general improvement.  She is now able to sleep and is able to turn her head to look over her shoulder when driving better.  She feels that continuing deficits are uncomfortable and limited ROM of her neck and R shoulder.  Her R shoulder is limited with elevation abilities like reaching up to pull the chain for a ceiling fan and she has limited ability with resistive activities like pulling a bed away from a wall to clean her home.  Peak pain with elevation of the shoulder can reach 8/10.    Objective     Reassessment  Cervical spine AROM, seated  Flexion WNL  Rotation L WNL with end range pain, R minimal loss with end range pain  LB L  Minimal loss with end range pain, R WNL with end range pain  Extension WNL    Shoulder functional AROM  Elevation in the scapular plane L 166 degrees and R 114 degrees with end range pain  Reach behind the neck, L T4 spinous process and R T2 spinous process with end range pain  Reach behind the back L T2 spinous process and R T5 spinous process with end range pain    MMT  Flexion L 5/5, R 4+/5 with pain  Abduction L 5/5, 4+/5 with pain  IR L 5/5, R 5/5  ER L 4+/5, 4/5 with pain  Extension L 5/5, 4+ to 5/5 with pain    Special tests: Supraspinatus test, L normal, R positive, Neers test, L negative, R positive and Rios/Topher test L positive and R positive    Treatment  1. Standing shoulder extension, supinated  green  theraband, 10 x 2, B  2. Standing row, supinated , green theraband, 10 x 2, B  3. ER walk out, yellow theraband, 10 x 2, B    Patient education:  Revised her theraband PREs to 3 x per week and added shoulder extension.    Assessment: Martha Diaz has been seen for 6 physical therapy sessions for neck and R shoulder pain.  Treatment has included therapeutic exercise, manual therapy, neuro-muscular retraining  and patient education with home exercise program . Progress to physical therapy goals is good.  She will benefit from continued skilled physical therapy to address remaining impairments and functional limitations.     STGs to be met in 12 visits.  1. Specific MMT of each shoulder is performed.(met)  2. A trial of manual decompression of the cervical spine is performed. (met)  3. Therapeutic exercises for posture improvement are begun. (met)     LTGs to be met in 24 visits.  1. Cervical spine AROM is pain free and WNL. (ongoing)  2. Functional AROM of the L shoulder is equal to the R and pain free. (ongoing)  3. Martha is independent with a HEP and education for self care. (ongoing)  4. SPADI survey is improved by 40%.(ongoing)    Plan:  She is to continue with current treatment.  I am going to add dry needling to the treatment plan, to include manual therapy, therapeutic exercise, neuromuscular reeducation, modalities like US and Estim and education for self care.  To see 1-2 x per week for 4 weeks.     Recommendations: Continue as planned  Timeframe: 1 month  Prognosis to achieve goals: good    PT Signature: Gold Grubbs PT      Based upon review of the patient's progress and continued therapy plan, it is my medical opinion that Martha Diaz should continue physical therapy treatment at [facility]  [address] [place] [address]  [phone].    Signature: __________________________________  Cayla Butt APRN    Timed:  Manual  Therapy:         mins  01308;  Therapeutic Exercise:    45     mins  85093;     Neuromuscular Elizabeth:        mins  74402;    Therapeutic Activity:          mins  88844;     Gait Training:           mins  45539;     Ultrasound:          mins  53077;    Electrical Stimulation:         mins  00422 ( );  Iontophoresis         mins 59001;    Untimed:  Electrical Stimulation:         mins  23480 ( );  Mechanical Traction:         mins  12115;     Timed Treatment:   45   mins   Total Treatment:     45   mins

## 2019-12-24 DIAGNOSIS — G44.039 EPISODIC PAROXYSMAL HEMICRANIA, NOT INTRACTABLE: ICD-10-CM

## 2019-12-24 DIAGNOSIS — L73.9 FOLLICULITIS: ICD-10-CM

## 2019-12-24 DIAGNOSIS — G43.001 MIGRAINE WITHOUT AURA AND WITH STATUS MIGRAINOSUS, NOT INTRACTABLE: ICD-10-CM

## 2019-12-24 RX ORDER — HYDROCODONE BITARTRATE AND ACETAMINOPHEN 7.5; 325 MG/1; MG/1
TABLET ORAL
Qty: 180 TABLET | Refills: 0 | Status: SHIPPED | OUTPATIENT
Start: 2019-12-24 | End: 2020-01-24 | Stop reason: SDUPTHER

## 2020-01-02 RX ORDER — AZELASTINE HYDROCHLORIDE, FLUTICASONE PROPIONATE 137; 50 UG/1; UG/1
1 SPRAY, METERED NASAL 2 TIMES DAILY
Qty: 1 BOTTLE | Refills: 5 | Status: SHIPPED | OUTPATIENT
Start: 2020-01-02 | End: 2020-08-10

## 2020-01-15 ENCOUNTER — TREATMENT (OUTPATIENT)
Dept: PHYSICAL THERAPY | Facility: CLINIC | Age: 40
End: 2020-01-15

## 2020-01-15 DIAGNOSIS — L70.0 ACNE VULGARIS: ICD-10-CM

## 2020-01-15 DIAGNOSIS — M54.2 CHRONIC NECK PAIN: ICD-10-CM

## 2020-01-15 DIAGNOSIS — M25.611 DECREASED ROM OF RIGHT SHOULDER: ICD-10-CM

## 2020-01-15 DIAGNOSIS — G89.29 CHRONIC NECK PAIN: ICD-10-CM

## 2020-01-15 DIAGNOSIS — G89.29 CHRONIC PAIN IN RIGHT SHOULDER: Primary | ICD-10-CM

## 2020-01-15 DIAGNOSIS — R29.898 DECREASED ROM OF NECK: ICD-10-CM

## 2020-01-15 DIAGNOSIS — R29.3 POOR POSTURE: ICD-10-CM

## 2020-01-15 DIAGNOSIS — M25.511 CHRONIC PAIN IN RIGHT SHOULDER: Primary | ICD-10-CM

## 2020-01-15 PROCEDURE — 97140 MANUAL THERAPY 1/> REGIONS: CPT | Performed by: PHYSICAL THERAPIST

## 2020-01-15 PROCEDURE — 97110 THERAPEUTIC EXERCISES: CPT | Performed by: PHYSICAL THERAPIST

## 2020-01-15 NOTE — PROGRESS NOTES
Physical Therapy Daily Progress Note    Patient: Martha Diaz   : 1980  Diagnosis/ICD-10 Code:  Chronic pain in right shoulder [M25.511, G89.29]  Referring practitioner: HELENA Farooq  Date of Initial Visit: Type: THERAPY  Noted: 2019  Today's Date: 1/15/2020  Patient seen for 7 sessions           Subjective: Martha continues to have some neck pain.  She noticed issues washing an elevated L arm with the R arm in the shower.  Pain recently peaks at 7/10.       Objective      Therapeutic exercise  1. Standing shoulder extension, supinated  green theraband, 10 x 2, B  2. Standing row, supinated , green theraband, 10 x 2, B  3. Standing punch, 1 lb., 5 x 2, B, unilaterally performed  4. Standing press, 1 lbs. 5 x 2, B  5. ER walk out, yellow theraband, 10 x 2, B  6. Prone scapular retraction with depression, 10 x 2  7. Lying craniovertebral (CV) flexion, x 20  8. Neck rotation, with CV flexion, x 10, B    Manual therapy:  In supine, with bolster under legs, cervical spine STM, decompression and sub-occipital release.    Patient education:  Prone scapular retraction with depression was added to her HEP.    Assessment:  She had a habit of shrugging her shoulder with exercises so cueing for scapular retraction with depression was used frequently.  She tolerated treatment well    Plan:  Monitor the effect of today's progression and continue as indicated.       Timed:    Manual Therapy:    10     mins  08621;  Therapeutic Exercise:    35     mins  16290;     Neuromuscular Elizabeth:        mins  77966;    Therapeutic Activity:          mins  22551;     Gait Training:           mins  82932;     Ultrasound:          mins  52640;    Electrical Stimulation:         mins  07069 ( );  Iontophoresis         mins 73436;  Aquatic Therapy         mins 00291;    Untimed:  Electrical Stimulation:         mins  47554 ( );  Mechanical Traction:         mins  10491;     Timed Treatment:   45    mins   Total Treatment:     45   mins  Gold Grubbs, PT  Physical Therapist

## 2020-01-17 ENCOUNTER — TREATMENT (OUTPATIENT)
Dept: PHYSICAL THERAPY | Facility: CLINIC | Age: 40
End: 2020-01-17

## 2020-01-17 DIAGNOSIS — M54.2 CHRONIC NECK PAIN: ICD-10-CM

## 2020-01-17 DIAGNOSIS — R29.3 POOR POSTURE: ICD-10-CM

## 2020-01-17 DIAGNOSIS — R29.898 DECREASED ROM OF NECK: ICD-10-CM

## 2020-01-17 DIAGNOSIS — M25.611 DECREASED ROM OF RIGHT SHOULDER: ICD-10-CM

## 2020-01-17 DIAGNOSIS — G89.29 CHRONIC PAIN IN RIGHT SHOULDER: Primary | ICD-10-CM

## 2020-01-17 DIAGNOSIS — G89.29 CHRONIC NECK PAIN: ICD-10-CM

## 2020-01-17 DIAGNOSIS — M25.511 CHRONIC PAIN IN RIGHT SHOULDER: Primary | ICD-10-CM

## 2020-01-17 PROCEDURE — 97140 MANUAL THERAPY 1/> REGIONS: CPT | Performed by: PHYSICAL THERAPIST

## 2020-01-17 PROCEDURE — 97110 THERAPEUTIC EXERCISES: CPT | Performed by: PHYSICAL THERAPIST

## 2020-01-18 DIAGNOSIS — G44.039 EPISODIC PAROXYSMAL HEMICRANIA, NOT INTRACTABLE: ICD-10-CM

## 2020-01-20 ENCOUNTER — TREATMENT (OUTPATIENT)
Dept: PHYSICAL THERAPY | Facility: CLINIC | Age: 40
End: 2020-01-20

## 2020-01-20 DIAGNOSIS — R29.3 POOR POSTURE: ICD-10-CM

## 2020-01-20 DIAGNOSIS — R29.898 DECREASED ROM OF NECK: ICD-10-CM

## 2020-01-20 DIAGNOSIS — M54.2 CHRONIC NECK PAIN: ICD-10-CM

## 2020-01-20 DIAGNOSIS — G89.29 CHRONIC PAIN IN RIGHT SHOULDER: Primary | ICD-10-CM

## 2020-01-20 DIAGNOSIS — G89.29 CHRONIC NECK PAIN: ICD-10-CM

## 2020-01-20 DIAGNOSIS — M25.611 DECREASED ROM OF RIGHT SHOULDER: ICD-10-CM

## 2020-01-20 DIAGNOSIS — M25.511 CHRONIC PAIN IN RIGHT SHOULDER: Primary | ICD-10-CM

## 2020-01-20 PROCEDURE — 97110 THERAPEUTIC EXERCISES: CPT | Performed by: PHYSICAL THERAPIST

## 2020-01-20 RX ORDER — PROPRANOLOL HCL 60 MG
CAPSULE, EXTENDED RELEASE 24HR ORAL
Qty: 30 CAPSULE | Refills: 5 | Status: SHIPPED | OUTPATIENT
Start: 2020-01-20 | End: 2020-07-20

## 2020-01-20 NOTE — PROGRESS NOTES
Re-Assessment / Re-Certification        Patient: Martha Diaz   : 1980  Diagnosis/ICD-10 Code:  Chronic pain in right shoulder [M25.511, G89.29]  Referring practitioner: HELENA Farooq  Date of Initial Visit: Type: THERAPY  Noted: 2019  Today's Date: 2020  Patient seen for 9 sessions      Subjective:     Clinical Progress: improved  Home Program Compliance: Yes  Treatment has included:  therapeutic exercise, manual therapy and patient education with home exercise program     Subjective: Martha reports that she notices improvement with improved ROM, she is sleeping better and able to roll onto the shoulder a little, whereas before she could not tolerate it at all.     Objective   Reassessment  Functional AROM of the the R shoulder  Elevation in the scapular plane, R 134 degrees    MMT R shoulder  Flexion 4+/5  Abduction 4+/5  ER 4+/5 with pain  Extension 4+/5 with pain    Therapeutic exercise  1. Standing shoulder extension, supinated  green theraband, 10 x 2, B  2. Standing row, supinated , green theraband, 10 x 2, B  3. Standing punch, 1 lb., 5 x 2, B  4. Standing press, 1 lb., 5 x 2, B  5. ER walk out, yellow theraband, 10 x 2, B    Manual therapy  STM, manual therapy to the cervical spine in supine and legs on bolster.    Assessment:  Martha Diaz has been seen for 9 physical therapy sessions for R shoulder and neck pain.  Treatment has included therapeutic exercise, manual therapy and patient education with home exercise program . Progress to physical therapy goals is good. Active elevation in the scapular plane has improved by 20 degrees since 2019 She will benefit from continued skilled physical therapy to address remaining impairments and functional limitations.     STGs to be met in 12 visits.  1. Specific MMT of each shoulder is performed.(met)  2. A trial of manual decompression of the cervical spine is performed. (met)  3. Therapeutic exercises for  posture improvement are begun. (met)     LTGs to be met in 24 visits.  1. Cervical spine AROM is pain free and WNL. (ongoing)  2. Functional AROM of the L shoulder is equal to the R and pain free. (ongoing)  3. Martha is independent with a HEP and education for self care. (ongoing)  4. SPADI survey is improved by 40%.(ongoing)    Plan:  Continue with current treatment for one month to progress towards meeting all goals.     Recommendations: Continue as planned  Timeframe: 1 month  Prognosis to achieve goals: good    PT Signature: Gold Grubbs, ZACKERY      Based upon review of the patient's progress and continued therapy plan, it is my medical opinion that Martha Diaz should continue physical therapy treatment at [facility]  [address] [place] [address] [phone].    Signature: __________________________________  Cayla Butt APRN    Timed:  Manual Therapy:    5     mins  70265;  Therapeutic Exercise:    35     mins  73703;     Neuromuscular Elizabeth:        mins  63349;    Therapeutic Activity:          mins  02470;     Gait Training:           mins  95988;     Ultrasound:          mins  02771;    Electrical Stimulation:         mins  38624 ( );  Iontophoresis         mins 89213;    Untimed:  Electrical Stimulation:         mins  25231 ( );  Mechanical Traction:         mins  22919;     Timed Treatment:   40   mins   Total Treatment:     40   mins

## 2020-01-22 ENCOUNTER — TREATMENT (OUTPATIENT)
Dept: PHYSICAL THERAPY | Facility: CLINIC | Age: 40
End: 2020-01-22

## 2020-01-22 DIAGNOSIS — M25.611 DECREASED ROM OF RIGHT SHOULDER: ICD-10-CM

## 2020-01-22 DIAGNOSIS — R29.3 POOR POSTURE: ICD-10-CM

## 2020-01-22 DIAGNOSIS — M54.2 CHRONIC NECK PAIN: ICD-10-CM

## 2020-01-22 DIAGNOSIS — G89.29 CHRONIC NECK PAIN: ICD-10-CM

## 2020-01-22 DIAGNOSIS — G89.29 CHRONIC PAIN IN RIGHT SHOULDER: Primary | ICD-10-CM

## 2020-01-22 DIAGNOSIS — R29.898 DECREASED ROM OF NECK: ICD-10-CM

## 2020-01-22 DIAGNOSIS — M25.511 CHRONIC PAIN IN RIGHT SHOULDER: Primary | ICD-10-CM

## 2020-01-22 PROCEDURE — 97110 THERAPEUTIC EXERCISES: CPT | Performed by: PHYSICAL THERAPIST

## 2020-01-22 NOTE — PROGRESS NOTES
Physical Therapy Daily Progress Note    Patient: Martha Diaz   : 1980  Diagnosis/ICD-10 Code:  Chronic pain in right shoulder [M25.511, G89.29]  Referring practitioner: HELENA Farooq  Date of Initial Visit: Type: THERAPY  Noted: 2019  Today's Date: 2020  Patient seen for 10 sessions           Subjective: Martha reported feeling tightness in the upper R shoulder this morning.  She does      Objective     Observation:  Reach behind the back, L T4, R T9 spinous process    Therapeutic exercise  1. Standing shoulder extension, supinated  blue theraband, 10 x 2, B  2. Standing row, supinated , blue theraband, 10 x 2, B  3. ER walk out, yellow theraband, 10 x 2, B  4. Standing punch, 1 lb., 5 x 2, B  5. Standing press, 1 lb., 5 x 2, B  6. Behind the back stretch, 30s x 3, R UE    Manual therapy  STM, manual therapy to the cervical spine in supine and legs on bolster    Patient education:  Behind the back stretch was added to her HEP    Assessment:  Martha had a noticeable, 4-5 inch difference, deficit with reach behind the back on the R UE versuse the L UE.  She requires cues to avoid shoulder elevation with the PREs.  She tolerated treatment well.      Plan:  Monitor the effects of today's session and continue as indicated.         Timed:    Manual Therapy:    5     mins  44919;  Therapeutic Exercise:    40     mins  75923;     Neuromuscular Elizabeth:        mins  95073;    Therapeutic Activity:          mins  86784;     Gait Training:           mins  15017;     Ultrasound:          mins  03111;    Electrical Stimulation:         mins  61275 ( );  Iontophoresis         mins 32922;  Aquatic Therapy         mins 73063;    Untimed:  Electrical Stimulation:         mins  90365 ( );  Mechanical Traction:         mins  90437;     Timed Treatment:   45   mins   Total Treatment:     45   mins  Gold Grubbs PT  Physical Therapist

## 2020-01-24 ENCOUNTER — OFFICE VISIT (OUTPATIENT)
Dept: INTERNAL MEDICINE | Facility: CLINIC | Age: 40
End: 2020-01-24

## 2020-01-24 VITALS
HEART RATE: 83 BPM | TEMPERATURE: 97.7 F | SYSTOLIC BLOOD PRESSURE: 128 MMHG | RESPIRATION RATE: 18 BRPM | OXYGEN SATURATION: 98 % | HEIGHT: 67 IN | WEIGHT: 230.2 LBS | BODY MASS INDEX: 36.13 KG/M2 | DIASTOLIC BLOOD PRESSURE: 80 MMHG

## 2020-01-24 DIAGNOSIS — Z13.6 ENCOUNTER FOR LIPID SCREENING FOR CARDIOVASCULAR DISEASE: ICD-10-CM

## 2020-01-24 DIAGNOSIS — G44.039 EPISODIC PAROXYSMAL HEMICRANIA, NOT INTRACTABLE: ICD-10-CM

## 2020-01-24 DIAGNOSIS — R35.89 POLYURIA: ICD-10-CM

## 2020-01-24 DIAGNOSIS — F17.200 CURRENT SMOKER: ICD-10-CM

## 2020-01-24 DIAGNOSIS — Z13.220 ENCOUNTER FOR LIPID SCREENING FOR CARDIOVASCULAR DISEASE: ICD-10-CM

## 2020-01-24 DIAGNOSIS — I10 ESSENTIAL HYPERTENSION: ICD-10-CM

## 2020-01-24 DIAGNOSIS — D50.0 IRON DEFICIENCY ANEMIA DUE TO CHRONIC BLOOD LOSS: ICD-10-CM

## 2020-01-24 DIAGNOSIS — G43.001 MIGRAINE WITHOUT AURA AND WITH STATUS MIGRAINOSUS, NOT INTRACTABLE: Primary | ICD-10-CM

## 2020-01-24 DIAGNOSIS — G43.001 MIGRAINE WITHOUT AURA AND WITH STATUS MIGRAINOSUS, NOT INTRACTABLE: ICD-10-CM

## 2020-01-24 PROCEDURE — 99214 OFFICE O/P EST MOD 30 MIN: CPT | Performed by: NURSE PRACTITIONER

## 2020-01-24 RX ORDER — HYDROCODONE BITARTRATE AND ACETAMINOPHEN 7.5; 325 MG/1; MG/1
TABLET ORAL
Qty: 180 TABLET | Refills: 0 | Status: SHIPPED | OUTPATIENT
Start: 2020-01-24 | End: 2020-01-24 | Stop reason: SDUPTHER

## 2020-01-24 RX ORDER — HYDROCODONE BITARTRATE AND ACETAMINOPHEN 7.5; 325 MG/1; MG/1
TABLET ORAL
Qty: 100 TABLET | Refills: 0 | Status: SHIPPED | OUTPATIENT
Start: 2020-01-24 | End: 2020-02-20 | Stop reason: SDUPTHER

## 2020-01-24 NOTE — PROGRESS NOTES
Name: Martha Diaz  :  1980    Subjective:      Chief Complaint   Patient presents with   • Nonintractable migraine     pt here for HYDROcodone-acetaminophen (NORCO) 7.5-325 MG per tablet         Martha Diaz is a 39 y.o. female prior patient of Chava Bridges MD. Dr Bridges has now retired and she is here to establish care with me.  She has multiple chronic medical conditions including: migraines, allergies, hypertension, OUMOU      Hypertension   This is a chronic problem. The current episode started more than 1 year ago (since ). The problem is controlled. Associated symptoms include headaches. Pertinent negatives include no blurred vision, chest pain, palpitations, peripheral edema or shortness of breath. There are no associated agents to hypertension. Risk factors for coronary artery disease include smoking/tobacco exposure. Past treatments include beta blockers and angiotensin blockers. Current antihypertension treatment includes beta blockers and angiotensin blockers. The current treatment provides moderate improvement. There are no compliance problems.  There is no history of CAD/MI or CVA.   Migraine    This is a chronic problem. The current episode started more than 1 year ago (Never seen by neurology ). The problem occurs daily. The problem has been waxing and waning. The pain is located in the temporal and frontal region. The pain does not radiate. The pain quality is similar to prior headaches. The quality of the pain is described as squeezing, aching and band-like. The pain is at a severity of 5/10. The pain is moderate. Associated symptoms include photophobia and a visual change. Pertinent negatives include no blurred vision, vomiting or weight loss. The symptoms are aggravated by exposure to cold air and weather changes (allergens). She has tried oral narcotics, triptans, NSAIDs, cold packs, antidepressants and beta blockers (tried (topamax, maxalt, imitrex)) for the  symptoms. The treatment provided moderate (best on norco ) relief. Her past medical history is significant for hypertension, migraine headaches and migraines in the family.   Anemia   Presents for follow-up visit. There has been no palpitations or weight loss. Signs of blood loss that are not present include melena. There are no compliance problems.  Compliance with medications is %. Treatment side effects: had GI effects with iron but better on Fergon      She states her allergies are better on dymista.   She has never had her house checked for mold.  Does have a dog.     She asks to have UA today.  States she has been having polyuria for several days.  No fever, flank pain, dysuria, hematuria.     She has gained weight over the holidays and states her diet is horrible.     I have reviewed the patient's medical history in detail and updated the computerized patient record.    Past Medical History:   Diagnosis Date   • Anxiety    • Bump     on the wrist   • Ganglion cyst of wrist     R wrist   • Headache    • Migraine    • Right wrist pain    • Stress        Past Surgical History:   Procedure Laterality Date   •  SECTION     • CHOLECYSTECTOMY         Family History   Problem Relation Age of Onset   • Sleep apnea Father    • Breast cancer Maternal Aunt         Dx in her 60's    • Stomach cancer Maternal Aunt    • Hypertension Maternal Grandmother    • Thyroid disease Maternal Grandmother    • Hypertension Maternal Grandfather    • Thyroid disease Maternal Grandfather    • Colon cancer Neg Hx        Social History     Socioeconomic History   • Marital status: Single     Spouse name: Not on file   • Number of children: Not on file   • Years of education: Not on file   • Highest education level: Not on file   Tobacco Use   • Smoking status: Former Smoker     Packs/day: 1.00     Years: 17.00     Pack years: 17.00     Types: Cigarettes   • Smokeless tobacco: Never Used   Substance and Sexual Activity  "  • Alcohol use: Yes     Comment: social   • Drug use: No   • Sexual activity: Defer       Most Recent Immunizations   Administered Date(s) Administered   • Flu Mist 11/01/2016         Review of Systems:   Review of Systems   Constitutional: Negative for weight loss.   Eyes: Positive for photophobia. Negative for blurred vision.   Respiratory: Negative for shortness of breath.    Cardiovascular: Negative for chest pain and palpitations.   Gastrointestinal: Negative for melena and vomiting.   Genitourinary: Positive for frequency. Negative for dysuria.   Neurological: Positive for headaches.         Objective:      Physical Exam:   Physical Exam   Constitutional: She is oriented to person, place, and time. She appears well-developed. She is cooperative.   HENT:   Head: Normocephalic.   Eyes: Conjunctivae are normal.   Neck: Neck supple. No thyromegaly present.   Cardiovascular: Normal rate, regular rhythm, normal heart sounds, intact distal pulses and normal pulses.   No murmur heard.  Pulmonary/Chest: Effort normal and breath sounds normal. She exhibits no deformity.   Equal, Unlabored   Abdominal: Soft. Bowel sounds are normal.   Musculoskeletal: Normal range of motion. She exhibits no edema.   Neurological: She is alert and oriented to person, place, and time.   Skin: Skin is warm and dry. Capillary refill takes 2 to 3 seconds.   Psychiatric: She has a normal mood and affect. Her behavior is normal. Judgment and thought content normal.   Vitals reviewed.        Vital Signs:  Vitals:    01/24/20 0944   BP: 128/80   BP Location: Left arm   Patient Position: Sitting   Cuff Size: Adult   Pulse: 83   Resp: 18   Temp: 97.7 °F (36.5 °C)   TempSrc: Core   SpO2: 98%   Weight: 104 kg (230 lb 3.2 oz)   Height: 170.2 cm (67.01\")     Body mass index is 36.05 kg/m².      Results Review:      REVIEWED AND DISCUSSED LAB RESULTS WITH PATIENT      Requested Prescriptions     Signed Prescriptions Disp Refills   • " HYDROcodone-acetaminophen (NORCO) 7.5-325 MG per tablet 180 tablet 0     Sig: TAKE 1 TAB Q 4 TO 6 HRS     Routine medications provided by this office will also be refilled via pharmacy request.       Current Outpatient Medications:   •  Azelastine-Fluticasone (DYMISTA) 137-50 MCG/ACT suspension, 1 spray into the nostril(s) as directed by provider 2 (Two) Times a Day., Disp: 1 bottle, Rfl: 5  •  ferrous gluconate (FERGON) 324 MG tablet, Take 1 tablet by mouth Daily With Breakfast., Disp: 30 tablet, Rfl: 5  •  HYDROcodone-acetaminophen (NORCO) 7.5-325 MG per tablet, TAKE 1 TAB Q 4 TO 6 HRS, Disp: 180 tablet, Rfl: 0  •  irbesartan (AVAPRO) 75 MG tablet, Take 1 tablet by mouth Every Night., Disp: 30 tablet, Rfl: 5  •  mupirocin (BACTROBAN) 2 % ointment, APPLY TOPICALLY TO THE APPROPRIATE AREA AS DIRECTED 3 (THREE) TIMES A DAY., Disp: 22 g, Rfl: 1  •  propranolol LA (INDERAL LA) 60 MG 24 hr capsule, TAKE 1 CAPSULE BY MOUTH EVERY DAY, Disp: 30 capsule, Rfl: 5  •  valACYclovir (VALTREX) 500 MG tablet, TAKE 1 TABLET (500 MG) BY MOUTH TWICE A DAY FOR 90 DAYS, Disp: , Rfl: 3       Assessment and Plan:        Problem List Items Addressed This Visit        Cardiovascular and Mediastinum    Essential hypertension    Relevant Orders    Comprehensive Metabolic Panel    CBC (No Diff)    Migraine - Primary     Headaches are improving with treatment.  Continue current treatment regimen.  Counseled regarding lifestyle modifications.    Will refer to neurology as she has never been evaluated before and I would prefer her not to rely on norco that had been given by Dr Bridges.            Relevant Medications    HYDROcodone-acetaminophen (NORCO) 7.5-325 MG per tablet    Other Relevant Orders    Ambulatory Referral to Neurology       Nervous and Auditory    Headache    Relevant Medications    HYDROcodone-acetaminophen (NORCO) 7.5-325 MG per tablet       Hematopoietic and Hemostatic    Iron deficiency anemia     Continue iron  "supplement  Check lab today          Relevant Orders    CBC (No Diff)    Ferritin       Other    Current smoker     Declines cessation   Aware of health risks            Other Visit Diagnoses     Encounter for lipid screening for cardiovascular disease        Relevant Orders    Comprehensive Metabolic Panel    Lipid Panel With LDL / HDL Ratio    Polyuria        Relevant Orders    Urinalysis With Culture If Indicated - Urine, Clean Catch           Discussed diet and weight loss.     The patient has read and signed the [facility] Controlled Substance Contract.  I will continue to see patient for regular follow up appointments.  They are well controlled on their medication.  SUZANNE is updated every 3 months. The patient is aware of the potential for addiction and dependence.    Discussed any change in Rx and discussed visit with patient.  All questions answered.      Return in about 3 months (around 4/24/2020).    Santiago \"Osito\" Dianne, APRN   01/24/20    Dragon disclaimer:   Much of this encounter note is an electronic transcription/translation of spoken language to printed text. The electronic translation of spoken language may permit erroneous, or at times, nonsensical words or phrases to be inadvertently transcribed; Although I have reviewed the note for such errors, some may still exist.     Additional Patient Counseling:       There are no Patient Instructions on file for this visit.  "

## 2020-01-24 NOTE — ASSESSMENT & PLAN NOTE
Headaches are improving with treatment.  Continue current treatment regimen.  Counseled regarding lifestyle modifications.    Will refer to neurology as she has never been evaluated before and I would prefer her not to rely on norco that had been given by Dr Bridges.

## 2020-01-25 LAB
ALBUMIN SERPL-MCNC: 4.3 G/DL (ref 3.5–5.2)
ALBUMIN/GLOB SERPL: 1.7 G/DL
ALP SERPL-CCNC: 68 U/L (ref 39–117)
ALT SERPL-CCNC: 10 U/L (ref 1–33)
APPEARANCE UR: CLEAR
AST SERPL-CCNC: 14 U/L (ref 1–32)
BACTERIA #/AREA URNS HPF: NORMAL /HPF
BILIRUB SERPL-MCNC: 0.3 MG/DL (ref 0.2–1.2)
BILIRUB UR QL STRIP: NEGATIVE
BUN SERPL-MCNC: 4 MG/DL (ref 6–20)
BUN/CREAT SERPL: 6.1 (ref 7–25)
CALCIUM SERPL-MCNC: 10.9 MG/DL (ref 8.6–10.5)
CHLORIDE SERPL-SCNC: 106 MMOL/L (ref 98–107)
CHOLEST SERPL-MCNC: 143 MG/DL (ref 0–200)
CO2 SERPL-SCNC: 21.2 MMOL/L (ref 22–29)
COLOR UR: YELLOW
CREAT SERPL-MCNC: 0.66 MG/DL (ref 0.57–1)
EPI CELLS #/AREA URNS HPF: NORMAL /HPF (ref 0–10)
ERYTHROCYTE [DISTWIDTH] IN BLOOD BY AUTOMATED COUNT: 12.7 % (ref 12.3–15.4)
FERRITIN SERPL-MCNC: 34 NG/ML (ref 13–150)
GLOBULIN SER CALC-MCNC: 2.6 GM/DL
GLUCOSE SERPL-MCNC: 91 MG/DL (ref 65–99)
GLUCOSE UR QL: NEGATIVE
HCT VFR BLD AUTO: 38.8 % (ref 34–46.6)
HDLC SERPL-MCNC: 54 MG/DL (ref 40–60)
HGB BLD-MCNC: 13.3 G/DL (ref 12–15.9)
HGB UR QL STRIP: NEGATIVE
KETONES UR QL STRIP: NEGATIVE
LDLC SERPL CALC-MCNC: 75 MG/DL (ref 0–100)
LDLC/HDLC SERPL: 1.4 {RATIO}
LEUKOCYTE ESTERASE UR QL STRIP: NEGATIVE
MCH RBC QN AUTO: 31 PG (ref 26.6–33)
MCHC RBC AUTO-ENTMCNC: 34.3 G/DL (ref 31.5–35.7)
MCV RBC AUTO: 90.4 FL (ref 79–97)
MICRO URNS: NORMAL
MICRO URNS: NORMAL
MUCOUS THREADS URNS QL MICRO: PRESENT /HPF
NITRITE UR QL STRIP: NEGATIVE
PH UR STRIP: 5.5 [PH] (ref 5–7.5)
PLATELET # BLD AUTO: 207 10*3/MM3 (ref 140–450)
POTASSIUM SERPL-SCNC: 4.4 MMOL/L (ref 3.5–5.2)
PROT SERPL-MCNC: 6.9 G/DL (ref 6–8.5)
PROT UR QL STRIP: NEGATIVE
RBC # BLD AUTO: 4.29 10*6/MM3 (ref 3.77–5.28)
RBC #/AREA URNS HPF: NORMAL /HPF (ref 0–2)
SODIUM SERPL-SCNC: 138 MMOL/L (ref 136–145)
SP GR UR: 1.02 (ref 1–1.03)
TRIGL SERPL-MCNC: 68 MG/DL (ref 0–150)
URINALYSIS REFLEX: NORMAL
UROBILINOGEN UR STRIP-MCNC: 0.2 MG/DL (ref 0.2–1)
VLDLC SERPL CALC-MCNC: 13.6 MG/DL
WBC # BLD AUTO: 6.11 10*3/MM3 (ref 3.4–10.8)
WBC #/AREA URNS HPF: NORMAL /HPF (ref 0–5)

## 2020-01-25 NOTE — PROGRESS NOTES
Notify patient that lab work looked good.  No significant changes.  Her hemoglobin is up to 13.3 from 12.3.  Continue her iron supplement.   Urine was negative

## 2020-01-27 ENCOUNTER — TREATMENT (OUTPATIENT)
Dept: PHYSICAL THERAPY | Facility: CLINIC | Age: 40
End: 2020-01-27

## 2020-01-27 DIAGNOSIS — M25.611 DECREASED ROM OF RIGHT SHOULDER: ICD-10-CM

## 2020-01-27 DIAGNOSIS — M54.2 CHRONIC NECK PAIN: ICD-10-CM

## 2020-01-27 DIAGNOSIS — R29.3 POOR POSTURE: ICD-10-CM

## 2020-01-27 DIAGNOSIS — R29.898 DECREASED ROM OF NECK: ICD-10-CM

## 2020-01-27 DIAGNOSIS — M25.511 CHRONIC PAIN IN RIGHT SHOULDER: Primary | ICD-10-CM

## 2020-01-27 DIAGNOSIS — G89.29 CHRONIC NECK PAIN: ICD-10-CM

## 2020-01-27 DIAGNOSIS — G89.29 CHRONIC PAIN IN RIGHT SHOULDER: Primary | ICD-10-CM

## 2020-01-27 PROCEDURE — 97140 MANUAL THERAPY 1/> REGIONS: CPT | Performed by: PHYSICAL THERAPIST

## 2020-01-27 PROCEDURE — 97110 THERAPEUTIC EXERCISES: CPT | Performed by: PHYSICAL THERAPIST

## 2020-01-27 NOTE — PROGRESS NOTES
Physical Therapy Daily Progress Note    Patient: Martha Diaz   : 1980  Diagnosis/ICD-10 Code:  Chronic pain in right shoulder [M25.511, G89.29]  Referring practitioner: HELENA Farooq  Date of Initial Visit: Type: THERAPY  Noted: 2019  Today's Date: 2020  Patient seen for 11 sessions           Subjective: Martha reported that the behind the back stretch is helping her R shoulder.    Objective     Therapeutic exercise  1. Standing shoulder extension, supinated  blue theraband, 10 x 2, B  2. Standing row, supinated , blue theraband, 10 x 2, B  3. ER walk out, yellow theraband, 10 x 2, B  4. Standing punch, 1 lb., 5 x 3, B  5. Standing press, 1 lb., 5 x 3, B  6. Sleeper stretch, 30s x 2, R UE  7. Behind the back stretch, 30s x 2, R UE     Manual therapy  STM, manual therapy to the cervical spine in supine and legs on bolster    Assessment: She required verbal cues for positioning of the shoulder girdle during all exercises, but particularly ER with the band.  She tolerated treatment well and is noticing improved behind the back ability with the R UE.    Plan: Do a short reassess on next visit to give Dr. Doll more current measurements for her visit with him later in the week.    Timed:    Manual Therapy:    10     mins  40829;  Therapeutic Exercise:    35     mins  49488;     Neuromuscular Elizabeth:        mins  64728;    Therapeutic Activity:          mins  50060;     Gait Training:           mins  37116;     Ultrasound:          mins  38113;    Electrical Stimulation:         mins  78035 ( );  Iontophoresis         mins 17942;  Aquatic Therapy         mins 91036;  Dry Needling                   mins    Untimed:  Electrical Stimulation:         mins  62476 ( );  Mechanical Traction:         mins  87888;     Timed Treatment:   45   mins   Total Treatment:     45   mins  Gold Grubbs PT  Physical Therapist

## 2020-01-29 ENCOUNTER — TREATMENT (OUTPATIENT)
Dept: PHYSICAL THERAPY | Facility: CLINIC | Age: 40
End: 2020-01-29

## 2020-01-29 DIAGNOSIS — R29.898 DECREASED ROM OF NECK: ICD-10-CM

## 2020-01-29 DIAGNOSIS — R29.3 POOR POSTURE: ICD-10-CM

## 2020-01-29 DIAGNOSIS — G89.29 CHRONIC NECK PAIN: ICD-10-CM

## 2020-01-29 DIAGNOSIS — M25.611 DECREASED ROM OF RIGHT SHOULDER: ICD-10-CM

## 2020-01-29 DIAGNOSIS — M25.511 CHRONIC PAIN IN RIGHT SHOULDER: Primary | ICD-10-CM

## 2020-01-29 DIAGNOSIS — G89.29 CHRONIC PAIN IN RIGHT SHOULDER: Primary | ICD-10-CM

## 2020-01-29 DIAGNOSIS — M54.2 CHRONIC NECK PAIN: ICD-10-CM

## 2020-01-29 PROCEDURE — 97110 THERAPEUTIC EXERCISES: CPT | Performed by: PHYSICAL THERAPIST

## 2020-01-29 NOTE — PROGRESS NOTES
Re-Assessment / Re-Certification        Patient: Martha Diaz   : 1980  Diagnosis/ICD-10 Code:  Chronic pain in right shoulder [M25.511, G89.29]  Referring practitioner: HELENA Farooq  Date of Initial Visit: Type: THERAPY  Noted: 2019  Today's Date: 2020  Patient seen for 12 sessions      Subjective:     Clinical Progress: improved  Home Program Compliance: Yes  Treatment has included:  therapeutic exercise, manual therapy and patient education with home exercise program     Subjective: Frequency of pain is less.  She is sleeping, performing personal hygiene, cleaning her car and home are all doable now as compared to when she started physical therapy.  Peak pain, when it occurs, is 5/10.  This occurs after a day of a lot of physical ans strenuous activity.  This pain occurs at the top of her R shoulder just superior to the glenohumeral area and occasionally in the lateral area of the neck    Objective   Reassessment    1. Functional outcome score: SPADI, 45/130, 35% disability    2. Functional AROM of the shoulder (measured before treatment)  Elevation in the scapular plane, 166 degrees with tightness at end range  Reach behind the neck, T3 spinous process with end range pain  Reach behind the back, T5 spinous process with end range pain    3. Supraspinatus test, L negative, R positive for pain    4. Hawkin's/Topher Test, L negative R positive    5. Cervical spine AROM  Flexion: craniovertebral WNL, full flexion 70 degrees with minimal end range pain  Rotation L 66 degrees and R 67 degrees, pain free  LB L  30 degrees and R 31 degrees, pain free   Extension 53 degrees, pain free    Therapeutic exercise  1. Standing shoulder extension, supinated  blue theraband, 10 x 2, B  2. Standing row, supinated , blue theraband, 10 x 2, B  3. ER walk out, yellow theraband, 10 x 2, B  4. Standing punch, 1 lb., x 10, B  5. Standing press, 1 lb., x 10, B    Assessment: Martha Diaz has  been seen for 12 physical therapy sessions for neck and R shoulder pain.  Treatment has included therapeutic exercise, manual therapy and patient education with home exercise program . Progress to physical therapy goals is good.  She will benefit from continued skilled physical therapy to address remaining impairments and functional limitations.     STGs to be met in 12 visits.  1. Specific MMT of each shoulder is performed.(met)  2. A trial of manual decompression of the cervical spine is performed. (met)  3. Therapeutic exercises for posture improvement are begun. (met)     LTGs to be met in 24 visits.  1. Cervical spine AROM is pain free and WNL. (ongoing)  2. Functional AROM of the R shoulder is equal to the L and pain free. (ongoing)  3. Martha is independent with a HEP and education for self care. (ongoing)  4. SPADI survey is improved by 40%.(ongoing)     Plan:  To continue for a few more visits, give a complete HEP and let her do that independently and come back one month later for reassessment.     Recommendations: Continue as planned  Timeframe: 1 month  Prognosis to achieve goals: good    PT Signature: Gold Grubbs PT      Based upon review of the patient's progress and continued therapy plan, it is my medical opinion that Martha Diaz should continue physical therapy treatment at [facility]  [address] [place] [address] [phone].    Signature: __________________________________  Cayla Butt APRN    Timed:  Manual Therapy:         mins  35540;  Therapeutic Exercise:    45     mins  73686;     Neuromuscular Elizabeth:        mins  84064;    Therapeutic Activity:          mins  76326;     Gait Training:           mins  04634;     Ultrasound:          mins  43517;    Electrical Stimulation:         mins  84467 ( );  Iontophoresis         mins 61543;  Orthotic Mgmt/training       mins 87026;  Orthotic check out       mins  30738;  Canalith Repositioning       mins 78108;  Dry Needling                   mins    Untimed:  Electrical Stimulation:         mins  45430 ( );  Mechanical Traction:         mins  47727;     Timed Treatment:   45   mins   Total Treatment:     45   mins

## 2020-01-30 ENCOUNTER — TELEPHONE (OUTPATIENT)
Dept: ORTHOPEDIC SURGERY | Facility: CLINIC | Age: 40
End: 2020-01-30

## 2020-01-30 NOTE — TELEPHONE ENCOUNTER
Pt called said her  added us on her benefits that when her case is settled we would get paid and why does she have to pay 100.00 up front.  I advised her it is our office policy because auto claims can take years to settle and if we cannot bill insurance and she is withholding her pip benefits everything at this point is self pay.  Self pay patients pay 100.00 before being seen.  She said she would get with her  and see what he says but I did advise her we do not get involved with attorneys/dm

## 2020-02-05 ENCOUNTER — TREATMENT (OUTPATIENT)
Dept: PHYSICAL THERAPY | Facility: CLINIC | Age: 40
End: 2020-02-05

## 2020-02-05 DIAGNOSIS — G89.29 CHRONIC PAIN IN RIGHT SHOULDER: Primary | ICD-10-CM

## 2020-02-05 DIAGNOSIS — M25.611 DECREASED ROM OF RIGHT SHOULDER: ICD-10-CM

## 2020-02-05 DIAGNOSIS — M25.511 CHRONIC PAIN IN RIGHT SHOULDER: Primary | ICD-10-CM

## 2020-02-05 DIAGNOSIS — R29.898 DECREASED ROM OF NECK: ICD-10-CM

## 2020-02-05 DIAGNOSIS — M54.2 CHRONIC NECK PAIN: ICD-10-CM

## 2020-02-05 DIAGNOSIS — G89.29 CHRONIC NECK PAIN: ICD-10-CM

## 2020-02-05 DIAGNOSIS — R29.3 POOR POSTURE: ICD-10-CM

## 2020-02-05 PROCEDURE — 97110 THERAPEUTIC EXERCISES: CPT | Performed by: PHYSICAL THERAPIST

## 2020-02-05 PROCEDURE — 97140 MANUAL THERAPY 1/> REGIONS: CPT | Performed by: PHYSICAL THERAPIST

## 2020-02-05 NOTE — PROGRESS NOTES
Physical Therapy Daily Progress Note    Patient: Martha Diaz   : 1980  Diagnosis/ICD-10 Code:  Chronic pain in right shoulder [M25.511, G89.29]  Referring practitioner: HELENA Farooq  Date of Initial Visit: Type: THERAPY  Noted: 2019  Today's Date: 2020  Patient seen for 13 sessions           Subjective: Martha reported that she is seeing Dr. Doll's APRN on 2020.     Objective   t.   Therapeutic exercise  1. Standing shoulder extension, supinated  blue theraband, 10 x 2, B  2. Standing row, supinated , blue theraband, 10 x 2, B  3. ER walk out, red theraband, 10 x 2, B  4. Standing punch, 1 lb., 5 x 3, B  5. Standing press, 1 lb., 5 x 3, B  6. Wall slides for shoulders, x 10    Manual therapy:  STM, decompression and sub-occipital release to the cervical spine.  R shoulder PROM for flexion, scaption, IR and ER with GH mobilizations for flexion, grade 4 x 10,  All in supine with legs on bolster.    Assessment:  Her R shoulder has a tendencey to elevate with the PREs and she needs verbal cues for correction.  She progressed the ER walk out to red theraband today.    Plan:  Monitor the effect of today's treatment and continue as indicated.       Timed:    Manual Therapy:    10     mins  23109;  Therapeutic Exercise:    30     mins  17400;     Neuromuscular Elizabeth:        mins  42149;    Therapeutic Activity:          mins  47065;     Gait Training:           mins  52877;     Ultrasound:          mins  94490;    Electrical Stimulation:         mins  29334 ( );  Iontophoresis         mins 41164;  Aquatic Therapy         mins 27660;  Dry Needling                   mins    Untimed:  Electrical Stimulation:         mins  26666 ( );  Mechanical Traction:         mins  97781;     Timed Treatment:   40   mins   Total Treatment:     40   mins  Gold Grubbs PT  Physical Therapist

## 2020-02-12 ENCOUNTER — TREATMENT (OUTPATIENT)
Dept: PHYSICAL THERAPY | Facility: CLINIC | Age: 40
End: 2020-02-12

## 2020-02-12 DIAGNOSIS — R29.3 POOR POSTURE: ICD-10-CM

## 2020-02-12 DIAGNOSIS — G89.29 CHRONIC NECK PAIN: ICD-10-CM

## 2020-02-12 DIAGNOSIS — R29.898 DECREASED ROM OF NECK: ICD-10-CM

## 2020-02-12 DIAGNOSIS — G89.29 CHRONIC PAIN IN RIGHT SHOULDER: Primary | ICD-10-CM

## 2020-02-12 DIAGNOSIS — M25.611 DECREASED ROM OF RIGHT SHOULDER: ICD-10-CM

## 2020-02-12 DIAGNOSIS — M54.2 CHRONIC NECK PAIN: ICD-10-CM

## 2020-02-12 DIAGNOSIS — M25.511 CHRONIC PAIN IN RIGHT SHOULDER: Primary | ICD-10-CM

## 2020-02-12 PROCEDURE — 97110 THERAPEUTIC EXERCISES: CPT | Performed by: PHYSICAL THERAPIST

## 2020-02-12 PROCEDURE — 97140 MANUAL THERAPY 1/> REGIONS: CPT | Performed by: PHYSICAL THERAPIST

## 2020-02-12 NOTE — PROGRESS NOTES
Physical Therapy Daily Progress Note    Patient: Martha Diaz   : 1980  Diagnosis/ICD-10 Code:  Chronic pain in right shoulder [M25.511, G89.29]  Referring practitioner: HELENA Farooq  Date of Initial Visit: Type: THERAPY  Noted: 2019  Today's Date: 2020  Patient seen for 14 sessions           Subjective: Martha reports that the R shoulder is feeling better.  The last time she noticed shoulder pain was over the weekend when adjusting her ceiling fan.  She notices pain in the R lateral neck at 6/10 today. Pain after treatment 1/10.     Objective     Therapeutic exercise  1. Standing shoulder extension, supinated  blue theraband, 10 x 2, B  2. Standing row, supinated , blue theraband, 10 x 2, B  3. ER walk out, red theraband, 10 x 2, B  4. Standing punch, 1 lb., 5 x 3, B  5. Standing press, 1 lb., 5 x 3, B  6. Lying CV flexion x 20  7. Lying neck rotation with CV flexion, x 10, B  8. Full neck flexion, with CV flexion, x 10  9. Wall slides for UEs x 10     Manual therapy, in supine with legs on bolster, STM, decompression and sub-occipital release to the cervical spine    Assessment:  The habit of shrugging the shoulders with the PREs is still present but diminished as compared to previous sessions.  She required verbal cues for technique.    Plan:  Continue on next visit with current treatment.       Timed:    Manual Therapy:    10     mins  67187;  Therapeutic Exercise:    35     mins  28617;     Neuromuscular Elizabeth:        mins  10517;    Therapeutic Activity:          mins  67814;     Gait Training:           mins  92887;     Ultrasound:          mins  44407;    Electrical Stimulation:         mins  81230 ( );  Iontophoresis         mins 12237;  Aquatic Therapy         mins 51069;  Dry Needling                   mins    Untimed:  Electrical Stimulation:         mins  58316 ( );  Mechanical Traction:         mins  23266;     Timed Treatment:   45   mins   Total  Treatment:     45   mins  Gold Grubbs, PT  Physical Therapist

## 2020-02-14 ENCOUNTER — TREATMENT (OUTPATIENT)
Dept: PHYSICAL THERAPY | Facility: CLINIC | Age: 40
End: 2020-02-14

## 2020-02-14 ENCOUNTER — OFFICE VISIT (OUTPATIENT)
Dept: ORTHOPEDIC SURGERY | Facility: CLINIC | Age: 40
End: 2020-02-14

## 2020-02-14 VITALS — WEIGHT: 227 LBS | TEMPERATURE: 97.4 F | BODY MASS INDEX: 35.63 KG/M2 | HEIGHT: 67 IN

## 2020-02-14 DIAGNOSIS — R29.898 DECREASED ROM OF NECK: ICD-10-CM

## 2020-02-14 DIAGNOSIS — M25.511 CHRONIC PAIN IN RIGHT SHOULDER: Primary | ICD-10-CM

## 2020-02-14 DIAGNOSIS — R29.3 POOR POSTURE: ICD-10-CM

## 2020-02-14 DIAGNOSIS — M54.2 CHRONIC NECK PAIN: ICD-10-CM

## 2020-02-14 DIAGNOSIS — S46.011D TRAUMATIC INCOMPLETE TEAR OF RIGHT ROTATOR CUFF, SUBSEQUENT ENCOUNTER: Primary | ICD-10-CM

## 2020-02-14 DIAGNOSIS — S43.431D TEAR OF RIGHT GLENOID LABRUM, SUBSEQUENT ENCOUNTER: ICD-10-CM

## 2020-02-14 DIAGNOSIS — M25.611 DECREASED ROM OF RIGHT SHOULDER: ICD-10-CM

## 2020-02-14 DIAGNOSIS — G89.29 CHRONIC PAIN IN RIGHT SHOULDER: Primary | ICD-10-CM

## 2020-02-14 DIAGNOSIS — G89.29 CHRONIC NECK PAIN: ICD-10-CM

## 2020-02-14 PROCEDURE — 99212 OFFICE O/P EST SF 10 MIN: CPT | Performed by: NURSE PRACTITIONER

## 2020-02-14 PROCEDURE — 97110 THERAPEUTIC EXERCISES: CPT | Performed by: PHYSICAL THERAPIST

## 2020-02-14 PROCEDURE — 97140 MANUAL THERAPY 1/> REGIONS: CPT | Performed by: PHYSICAL THERAPIST

## 2020-02-14 NOTE — PROGRESS NOTES
"Chief Complaint:  Follow up right shoulder pain    HPI:  Ms. Diaz returns to clinic for right shoulder follow up.  Reports pain is minimal at this point.  She feels that her strength has improved.  No new complaints or concerns today.    Vitals:    02/14/20 1503   Temp: 97.4 °F (36.3 °C)   Weight: 103 kg (227 lb)   Height: 170.2 cm (67\")     Exam: The right shoulder is examined.  Skin is benign.  No focal tenderness.  Full shoulder motion.  No evident instability or apprehension.  Negative Neer's and Rios.  Positive Randolph's.  Mechanical popping noted with range of motion.  Good strength in the rotator cuff, deltoid, biceps, triceps, and .  Sensation is intact.  Brisk capillary refill in the fingers with good skin turgor.    Imaging:  None taken    Assessment:  1.  Right rotator cuff tear  2.  Right shoulder labral tear    Plan:  We discussed the natural progression of tears.  Overall, she seems to be doing well.  I recommend she return to physical therapy and begin the transition to a home exercise program.  I stressed the importance of continuing the exercises to maintain ROM and strength.   She acknowledged understanding of all we discussed.  If her symptoms persist or worsen, I am happy to see her back.  Going forward, she will follow up with me as needed.     Cayla Butt, HELENA     02/14/2020    "

## 2020-02-17 ENCOUNTER — TREATMENT (OUTPATIENT)
Dept: PHYSICAL THERAPY | Facility: CLINIC | Age: 40
End: 2020-02-17

## 2020-02-17 DIAGNOSIS — G89.29 CHRONIC PAIN IN RIGHT SHOULDER: Primary | ICD-10-CM

## 2020-02-17 DIAGNOSIS — M25.611 DECREASED ROM OF RIGHT SHOULDER: ICD-10-CM

## 2020-02-17 DIAGNOSIS — M25.511 CHRONIC PAIN IN RIGHT SHOULDER: Primary | ICD-10-CM

## 2020-02-17 DIAGNOSIS — G89.29 CHRONIC NECK PAIN: ICD-10-CM

## 2020-02-17 DIAGNOSIS — R29.3 POOR POSTURE: ICD-10-CM

## 2020-02-17 DIAGNOSIS — R29.898 DECREASED ROM OF NECK: ICD-10-CM

## 2020-02-17 DIAGNOSIS — M54.2 CHRONIC NECK PAIN: ICD-10-CM

## 2020-02-17 PROCEDURE — 97110 THERAPEUTIC EXERCISES: CPT | Performed by: PHYSICAL THERAPIST

## 2020-02-17 NOTE — PROGRESS NOTES
Physical Therapy Daily Progress/Discharge Note    Patient: Martha Diaz   : 1980  Diagnosis/ICD-10 Code:  Chronic pain in right shoulder [M25.511, G89.29]  Referring practitioner: HELENA Farooq  Date of Initial Visit: Type: THERAPY  Noted: 2019  Today's Date: 2020  Patient seen for 16 sessions           Subjective: Martha reports seeing HELENA Bernardo, last Friday and was told to follow her home exercises and education moving forward.  She is having a little ache in her R shoulder and R neck today.    Objective     1. Standing shoulder extension, supinated  blue theraband, x 10, B  2. Standing row, supinated , blue theraband, x 10  3. ER walk out, green theraband, x 10, B  4. Standing punch, 1 lb., 5 x 3, B  5. Standing press, 1 lb., 5 x 3, B  6. Lying CV flexion x 20  7. Lying neck rotation with CV flexion, x 10, B  8. Full neck flexion, with CV flexion, x 10  9. Wall slides for UEs x 10    Patient education:  A sheet with instructions of her ongoing program was issued to her today.    Functional AROM of the R shoulder  1. Elevation in the scapular plane: 164 degrees  2. Reach behind the neck: T4 spinous process  3. Reach behind the back: T5 spinous process    Cervical AROM grossly  Flexion: craniovertebral and full were WFL  Rotation: B WFL, stiffness felt going to her R  LB: B minimal loss, B stiffness felt  Extension: WFL    SPADI: 10/130, 8% disability    Assessment: Martha Diaz was seen for 16 physical therapy sessions for cervical spine and R shoulder pain/dysfunction.  Treatment included therapeutic exercise, manual therapy and patient education with home exercise program . Progress to physical therapy goals was good.  She was discharged to an independent [facility] and provided patient education to self-manage condition.     STGs to be met in 12 visits.  1. Specific MMT of each shoulder is performed.(met)  2. A trial of manual decompression of the cervical spine is  performed. (met)  3. Therapeutic exercises for posture improvement are begun. (met)     LTGs to be met in 24 visits.  1. Cervical spine AROM is pain free and WNL. (not met)  2. Functional AROM of the R shoulder is equal to the L and pain free. (met)  3. Martha is independent with a HEP and education for self care. (met)  4. SPADI survey is improved by 40%.(met)    Plan:  Martha is independent with a HEP and is ready for independent self care at this time.      Timed:    Manual Therapy:         mins  78714;  Therapeutic Exercise:    40     mins  89217;     Neuromuscular Elizabeth:        mins  80514;    Therapeutic Activity:          mins  23941;     Gait Training:           mins  07207;     Ultrasound:          mins  09331;    Electrical Stimulation:         mins  01981 ( );  Iontophoresis         mins 59139;  Aquatic Therapy         mins 51006;  Dry Needling                   mins    Untimed:  Electrical Stimulation:         mins  20944 ( );  Mechanical Traction:         mins  34410;     Timed Treatment:   40   mins   Total Treatment:     40   mins  Gold Grubbs PT  Physical Therapist

## 2020-02-20 DIAGNOSIS — G44.039 EPISODIC PAROXYSMAL HEMICRANIA, NOT INTRACTABLE: ICD-10-CM

## 2020-02-20 DIAGNOSIS — G43.001 MIGRAINE WITHOUT AURA AND WITH STATUS MIGRAINOSUS, NOT INTRACTABLE: ICD-10-CM

## 2020-02-20 RX ORDER — HYDROCODONE BITARTRATE AND ACETAMINOPHEN 7.5; 325 MG/1; MG/1
TABLET ORAL
Qty: 100 TABLET | Refills: 0 | Status: SHIPPED | OUTPATIENT
Start: 2020-02-20 | End: 2020-03-24 | Stop reason: SDUPTHER

## 2020-03-20 DIAGNOSIS — L73.9 FOLLICULITIS: ICD-10-CM

## 2020-03-24 ENCOUNTER — TELEPHONE (OUTPATIENT)
Dept: INTERNAL MEDICINE | Facility: CLINIC | Age: 40
End: 2020-03-24

## 2020-03-24 DIAGNOSIS — G43.001 MIGRAINE WITHOUT AURA AND WITH STATUS MIGRAINOSUS, NOT INTRACTABLE: ICD-10-CM

## 2020-03-24 DIAGNOSIS — G44.039 EPISODIC PAROXYSMAL HEMICRANIA, NOT INTRACTABLE: ICD-10-CM

## 2020-03-24 NOTE — TELEPHONE ENCOUNTER
PATIENT CALLED TO REQUEST A REFILL ON HYDROcodone-acetaminophen (NORCO) 7.5-325 MG  180  tabletS    PLEASE SEND THE RX [id number] - [place], [place] - [address] [place] [address] RD. AT [facility] [phone] [facility] [phone] FX

## 2020-03-26 ENCOUNTER — TELEPHONE (OUTPATIENT)
Dept: INTERNAL MEDICINE | Facility: CLINIC | Age: 40
End: 2020-03-26

## 2020-03-27 RX ORDER — HYDROCODONE BITARTRATE AND ACETAMINOPHEN 7.5; 325 MG/1; MG/1
TABLET ORAL
Qty: 120 TABLET | Refills: 0 | Status: SHIPPED | OUTPATIENT
Start: 2020-03-27 | End: 2020-04-24 | Stop reason: SDUPTHER

## 2020-04-23 DIAGNOSIS — G44.039 EPISODIC PAROXYSMAL HEMICRANIA, NOT INTRACTABLE: ICD-10-CM

## 2020-04-23 DIAGNOSIS — G43.001 MIGRAINE WITHOUT AURA AND WITH STATUS MIGRAINOSUS, NOT INTRACTABLE: ICD-10-CM

## 2020-04-23 NOTE — TELEPHONE ENCOUNTER
PT CALLED AND REQUESTED REFILL FOR HYDROcodone-acetaminophen (NORCO) 7.5-325 MG per tablet BE SENT TO     [id number] - [place], [place] - [facility] [address] AT [facility] - [phone] [facility] [phone] FX     PT CALL BACK  [phone]

## 2020-04-24 ENCOUNTER — TELEPHONE (OUTPATIENT)
Dept: INTERNAL MEDICINE | Facility: CLINIC | Age: 40
End: 2020-04-24

## 2020-04-24 DIAGNOSIS — G43.001 MIGRAINE WITHOUT AURA AND WITH STATUS MIGRAINOSUS, NOT INTRACTABLE: ICD-10-CM

## 2020-04-24 DIAGNOSIS — G44.039 EPISODIC PAROXYSMAL HEMICRANIA, NOT INTRACTABLE: ICD-10-CM

## 2020-04-24 RX ORDER — HYDROCODONE BITARTRATE AND ACETAMINOPHEN 7.5; 325 MG/1; MG/1
TABLET ORAL
Qty: 120 TABLET | Refills: 0 | Status: SHIPPED | OUTPATIENT
Start: 2020-04-24 | End: 2020-04-24 | Stop reason: SDUPTHER

## 2020-04-24 RX ORDER — HYDROCODONE BITARTRATE AND ACETAMINOPHEN 7.5; 325 MG/1; MG/1
TABLET ORAL
Qty: 120 TABLET | Refills: 0 | Status: SHIPPED | OUTPATIENT
Start: 2020-04-24 | End: 2020-05-22 | Stop reason: SDUPTHER

## 2020-04-24 NOTE — TELEPHONE ENCOUNTER
PT CALLED REQUESTING A REFILL FOR HYDROcodone-acetaminophen (NORCO) 7.5-325 MG per tablet. PT STATES PHARMACY DOES NOT HAVE REFILL REQUEST.    [facility] CONFIRMED     PT CALL BACK   [phone]

## 2020-04-24 NOTE — TELEPHONE ENCOUNTER
Patient states that her HYDROcodone-acetaminophen (NORCO) 7.5-325 MG per tablet was called in to a [facility] that does not have it available. She is needing to have the prescription resent to the [facility] at [address] [place] [address]. Please advise.

## 2020-04-24 NOTE — TELEPHONE ENCOUNTER
Discussed with initial [facility] pharmacy ([phone])to confirm they did not have the medication.  They will delete the prescription.  New prescription sent to [facility] [address] [facility]

## 2020-04-27 RX ORDER — HYDROCODONE BITARTRATE AND ACETAMINOPHEN 7.5; 325 MG/1; MG/1
TABLET ORAL
Qty: 120 TABLET | Refills: 0 | Status: SHIPPED | OUTPATIENT
Start: 2020-04-27 | End: 2020-06-19 | Stop reason: SDUPTHER

## 2020-05-18 DIAGNOSIS — L73.9 FOLLICULITIS: ICD-10-CM

## 2020-05-22 DIAGNOSIS — G43.001 MIGRAINE WITHOUT AURA AND WITH STATUS MIGRAINOSUS, NOT INTRACTABLE: ICD-10-CM

## 2020-05-22 DIAGNOSIS — G44.039 EPISODIC PAROXYSMAL HEMICRANIA, NOT INTRACTABLE: ICD-10-CM

## 2020-05-22 DIAGNOSIS — D50.8 IRON DEFICIENCY ANEMIA SECONDARY TO INADEQUATE DIETARY IRON INTAKE: ICD-10-CM

## 2020-05-22 RX ORDER — DOXYCYCLINE HYCLATE 50 MG/1
324 CAPSULE, GELATIN COATED ORAL
Qty: 30 TABLET | Refills: 5 | Status: SHIPPED | OUTPATIENT
Start: 2020-05-22

## 2020-05-22 RX ORDER — HYDROCODONE BITARTRATE AND ACETAMINOPHEN 7.5; 325 MG/1; MG/1
TABLET ORAL
Qty: 120 TABLET | Refills: 0 | Status: SHIPPED | OUTPATIENT
Start: 2020-05-22 | End: 2020-06-23 | Stop reason: SDUPTHER

## 2020-05-22 NOTE — TELEPHONE ENCOUNTER
PATIENT CALLED IN  TO REQUEST A REFILL OF ferrous gluconate (FERGON) 324 MG tablet . PATIENT IS OUT OF MEDICATION . PLEASE SEND TO [facility] [address] . PATIENT CALL BACK [phone].

## 2020-06-18 ENCOUNTER — DOCUMENTATION (OUTPATIENT)
Dept: INTERNAL MEDICINE | Facility: CLINIC | Age: 40
End: 2020-06-18

## 2020-06-18 ENCOUNTER — TELEPHONE (OUTPATIENT)
Dept: INTERNAL MEDICINE | Facility: CLINIC | Age: 40
End: 2020-06-18

## 2020-06-18 ENCOUNTER — E-VISIT (OUTPATIENT)
Dept: INTERNAL MEDICINE | Facility: CLINIC | Age: 40
End: 2020-06-18

## 2020-06-18 NOTE — PROGRESS NOTES
E-visit cancelled.   She had an office appointment at the same time and she went online and started an e-visit on 6/18/20.  Staff called patient, office visit rescheduled for 6/19 and e-visit concerns to be addressed at that appointment.

## 2020-06-19 ENCOUNTER — OFFICE VISIT (OUTPATIENT)
Dept: INTERNAL MEDICINE | Facility: CLINIC | Age: 40
End: 2020-06-19

## 2020-06-19 VITALS
BODY MASS INDEX: 35.63 KG/M2 | OXYGEN SATURATION: 98 % | WEIGHT: 227 LBS | SYSTOLIC BLOOD PRESSURE: 134 MMHG | DIASTOLIC BLOOD PRESSURE: 82 MMHG | TEMPERATURE: 97.3 F | RESPIRATION RATE: 16 BRPM | HEIGHT: 67 IN | HEART RATE: 83 BPM

## 2020-06-19 DIAGNOSIS — I10 ESSENTIAL HYPERTENSION: Primary | ICD-10-CM

## 2020-06-19 DIAGNOSIS — Z71.6 ENCOUNTER FOR SMOKING CESSATION COUNSELING: ICD-10-CM

## 2020-06-19 DIAGNOSIS — E83.52 HYPERCALCEMIA: ICD-10-CM

## 2020-06-19 DIAGNOSIS — Z11.59 ENCOUNTER FOR HEPATITIS C SCREENING TEST FOR LOW RISK PATIENT: ICD-10-CM

## 2020-06-19 DIAGNOSIS — J30.1 SEASONAL ALLERGIC RHINITIS DUE TO POLLEN: ICD-10-CM

## 2020-06-19 DIAGNOSIS — G43.001 MIGRAINE WITHOUT AURA AND WITH STATUS MIGRAINOSUS, NOT INTRACTABLE: ICD-10-CM

## 2020-06-19 DIAGNOSIS — Z79.899 LONG TERM USE OF DRUG: ICD-10-CM

## 2020-06-19 PROCEDURE — 99406 BEHAV CHNG SMOKING 3-10 MIN: CPT | Performed by: NURSE PRACTITIONER

## 2020-06-19 PROCEDURE — 99214 OFFICE O/P EST MOD 30 MIN: CPT | Performed by: NURSE PRACTITIONER

## 2020-06-19 RX ORDER — MONTELUKAST SODIUM 10 MG/1
10 TABLET ORAL NIGHTLY
Qty: 30 TABLET | Refills: 5 | Status: SHIPPED | OUTPATIENT
Start: 2020-06-19 | End: 2020-12-16

## 2020-06-19 RX ORDER — NICOTINE 21 MG/24HR
1 PATCH, TRANSDERMAL 24 HOURS TRANSDERMAL EVERY 24 HOURS
Qty: 14 EACH | Refills: 0 | Status: SHIPPED | OUTPATIENT
Start: 2020-06-19

## 2020-06-19 RX ORDER — CETIRIZINE HYDROCHLORIDE 10 MG/1
10 TABLET ORAL DAILY
Qty: 30 TABLET | Refills: 5
Start: 2020-06-19

## 2020-06-19 NOTE — PROGRESS NOTES
Name: Martha Diaz  :  1980    Subjective:      Chief Complaint   Patient presents with   • Follow-up     Pt presents here today for a 3 month follow up.   • Hypertension        Martha Diaz is a 39 y.o. female patient.  She has multiple chronic medical conditions including: Hypertension, migraines, allergies, current smoker     She previously worked as a x-ray technician.  She states she now has a job at [employer] in [employer] working from home.    Hypertension   This is a chronic problem. The current episode started more than 1 year ago (since ). The problem is controlled. Associated symptoms include headaches. Pertinent negatives include no blurred vision, chest pain, palpitations, peripheral edema or shortness of breath. There are no associated agents to hypertension. Risk factors for coronary artery disease include smoking/tobacco exposure. Past treatments include beta blockers and angiotensin blockers. Current antihypertension treatment includes beta blockers and angiotensin blockers. The current treatment provides moderate improvement. There are no compliance problems.  There is no history of CAD/MI or CVA.     Migraine    This is a chronic problem. The current episode started more than 1 year ago (Never seen by neurology ). The problem occurs daily. The problem has been waxing and waning. The pain is located in the temporal and frontal region. The pain does not radiate. The pain quality is similar to prior headaches. The quality of the pain is described as squeezing, aching and band-like. The pain is at a severity of 5/10. The pain is moderate. Associated symptoms include photophobia and a visual change. Pertinent negatives include no blurred vision, vomiting or weight loss. The symptoms are aggravated by exposure to cold air and weather changes (allergens). She has tried oral narcotics, triptans, NSAIDs, cold packs, antidepressants and beta blockers (tried (topamax, maxalt,  imitrex)) for the symptoms. The treatment provided moderate (best on norco ) relief. Her past medical history is significant for hypertension, migraine headaches and migraines in the family.   I have referred her to neruology.  It was rescheduled due to Covid.   She will see them in the next few weeks.  We already started discussing possibility of switching over to Emgality.  When I took her over as a patient she was already on Norco for several years for her migraines and I explained to her this is not approved or appropriate treatment.  We will continue to wean her off the Norco.    She has chronic allergies.  She has been taken Dymista is been mildly effective.  She states in the summertime when she goes outside it causes the allergy flare which thus starts her migraine symptoms.  She is recently been working for [employer] outside quite a bit.  She has a dog and planning on getting a second dog.  States the pain starts behind her eyes with allergies then translates into a migraine.  She has not been to ENT and would like referral today.  She states she has been taking her son's Singulair and Zyrtec and has been mildly more effective and would like to be started on this today as well.    Martha Diaz  reports that she has quit smoking. Her smoking use included cigarettes. She has a 17.00 pack-year smoking history. She has never used smokeless tobacco.. I have educated her on the risk of diseases from using tobacco products such as cancer, COPD and heart diease.     I advised her to quit and she is willing to quit. We have discussed the following method/s for tobacco cessation:  Education Material Counseling Prescription Medicaiton.  Together we have set a quit date for 1 week from today.  She will follow up with me in 3 months or sooner to check on her progress.    I spent 8 minutes counseling the patient.    She has stopped drinking coffee.     On her last BMP her calcium was mildly elevated.  No  complaint of muscle or joint aches.        I have reviewed the patient's medical history in detail and updated the computerized patient record.    Past Medical History:   Diagnosis Date   • Anxiety    • Bump     on the wrist   • Ganglion cyst of wrist     R wrist   • Headache    • Migraine    • Right wrist pain    • Stress        Past Surgical History:   Procedure Laterality Date   •  SECTION     • CHOLECYSTECTOMY         Family History   Problem Relation Age of Onset   • Sleep apnea Father    • Breast cancer Maternal Aunt         Dx in her 60's    • Stomach cancer Maternal Aunt    • Hypertension Maternal Grandmother    • Thyroid disease Maternal Grandmother    • Hypertension Maternal Grandfather    • Thyroid disease Maternal Grandfather    • Colon cancer Neg Hx        Social History     Socioeconomic History   • Marital status: Single     Spouse name: Not on file   • Number of children: Not on file   • Years of education: Not on file   • Highest education level: Not on file   Tobacco Use   • Smoking status: Former Smoker     Packs/day: 1.00     Years: 17.00     Pack years: 17.00     Types: Cigarettes   • Smokeless tobacco: Never Used   Substance and Sexual Activity   • Alcohol use: Yes     Comment: social   • Drug use: No   • Sexual activity: Defer       Most Recent Immunizations   Administered Date(s) Administered   • Flu Mist 2016         Review of Systems:   Review of Systems   Constitutional: Negative for chills, fever and unexpected weight change.   HENT: Positive for congestion, sinus pressure and sinus pain.    Eyes: Positive for photophobia.   Respiratory: Negative.    Cardiovascular: Negative.    Gastrointestinal: Negative.    Genitourinary: Negative.    Musculoskeletal: Negative.    Neurological: Positive for headaches.         Objective:      Physical Exam:   Physical Exam   Constitutional: She is oriented to person, place, and time. She appears well-developed. She is cooperative.  "  HENT:   Head: Normocephalic.   Nose: Nose normal.   Mouth/Throat: Oropharynx is clear and moist.   Eyes: Pupils are equal, round, and reactive to light. Conjunctivae are normal.   Neck: Normal range of motion.   Cardiovascular: Normal rate, regular rhythm, normal heart sounds, intact distal pulses and normal pulses.   Pulmonary/Chest: Effort normal and breath sounds normal. She exhibits no deformity.   Equal, Unlabored   Abdominal: Soft. Bowel sounds are normal.   Musculoskeletal: Normal range of motion. She exhibits no edema.   Neurological: She is alert and oriented to person, place, and time.   Skin: Skin is warm and dry. Capillary refill takes 2 to 3 seconds.   Psychiatric: She has a normal mood and affect. Her behavior is normal. Judgment and thought content normal.   Vitals reviewed.        Vital Signs:  Vitals:    06/19/20 0940 06/19/20 1001   BP: (!) 167/118 134/82   BP Location: Right arm    Patient Position: Sitting    Cuff Size: Adult    Pulse: 83    Resp: 16    Temp: 97.3 °F (36.3 °C)    TempSrc: Temporal    SpO2: 98%    Weight: 103 kg (227 lb)    Height: 170.2 cm (67\")      Body mass index is 35.55 kg/m².      Results Review:      REVIEWED AND DISCUSSED LAB RESULTS WITH PATIENT      Requested Prescriptions     Signed Prescriptions Disp Refills   • montelukast (Singulair) 10 MG tablet 30 tablet 5     Sig: Take 1 tablet by mouth Every Night.   • cetirizine (zyrTEC) 10 MG tablet 30 tablet 5     Sig: Take 1 tablet by mouth Daily.   • nicotine (Nicoderm CQ) 21 MG/24HR patch 14 each 0     Sig: Place 1 patch on the skin as directed by provider Daily.   • nicotine (Nicoderm CQ) 14 MG/24HR patch 14 each 0     Sig: Place 1 patch on the skin as directed by provider Daily.   • nicotine (Nicoderm CQ) 7 MG/24HR patch 28 patch 0     Sig: Place 1 patch on the skin as directed by provider Daily for 28 days.     Routine medications provided by this office will also be refilled via pharmacy request.       Current " Outpatient Medications:   •  Azelastine-Fluticasone (DYMISTA) 137-50 MCG/ACT suspension, 1 spray into the nostril(s) as directed by provider 2 (Two) Times a Day., Disp: 1 bottle, Rfl: 5  •  ferrous gluconate (FERGON) 324 MG tablet, Take 1 tablet by mouth Daily With Breakfast., Disp: 30 tablet, Rfl: 5  •  HYDROcodone-acetaminophen (NORCO) 7.5-325 MG per tablet, TAKE 1 TAB Q 4 TO 6 HRS, Disp: 120 tablet, Rfl: 0  •  mupirocin (BACTROBAN) 2 % ointment, APPLY TOPICALLY TO THE APPROPRIATE AREA AS DIRECTED 3 (THREE) TIMES A DAY., Disp: 22 g, Rfl: 1  •  propranolol LA (INDERAL LA) 60 MG 24 hr capsule, TAKE 1 CAPSULE BY MOUTH EVERY DAY, Disp: 30 capsule, Rfl: 5  •  valACYclovir (VALTREX) 500 MG tablet, TAKE 1 TABLET (500 MG) BY MOUTH TWICE A DAY FOR 90 DAYS, Disp: , Rfl: 3  •  cetirizine (zyrTEC) 10 MG tablet, Take 1 tablet by mouth Daily., Disp: 30 tablet, Rfl: 5  •  montelukast (Singulair) 10 MG tablet, Take 1 tablet by mouth Every Night., Disp: 30 tablet, Rfl: 5  •  nicotine (Nicoderm CQ) 14 MG/24HR patch, Place 1 patch on the skin as directed by provider Daily., Disp: 14 each, Rfl: 0  •  nicotine (Nicoderm CQ) 21 MG/24HR patch, Place 1 patch on the skin as directed by provider Daily., Disp: 14 each, Rfl: 0  •  nicotine (Nicoderm CQ) 7 MG/24HR patch, Place 1 patch on the skin as directed by provider Daily for 28 days., Disp: 28 patch, Rfl: 0       Assessment and Plan:        Problem List Items Addressed This Visit        Cardiovascular and Mediastinum    Essential hypertension - Primary     Hypertension is improving with treatment.  Continue current treatment regimen.  Regular aerobic exercise.  Stop smoking.  Blood pressure will be reassessed at the next regular appointment.         Migraine     Headaches are unchanged.  Refer to neurology.   ? Emgality   Wean off norco    Refer to ENT for allergies/sinus as this is a significant trigger.                 Respiratory    Seasonal allergic rhinitis due to pollen     Add  "singulair          Relevant Medications    montelukast (Singulair) 10 MG tablet    cetirizine (zyrTEC) 10 MG tablet    Other Relevant Orders    Ambulatory Referral to ENT (Otolaryngology)      Other Visit Diagnoses     Hypercalcemia        Relevant Orders    Calcium, Ionized    Encounter for hepatitis C screening test for low risk patient        Relevant Orders    HCV Antibody With / Rflx To Verification    Encounter for smoking cessation counseling        Relevant Medications    nicotine (Nicoderm CQ) 21 MG/24HR patch    nicotine (Nicoderm CQ) 14 MG/24HR patch    nicotine (Nicoderm CQ) 7 MG/24HR patch    Long term use of drug        Relevant Orders    ToxASSURE Select 13 (MW) - Urine, Clean Catch         The patient has read and signed the [facility] Controlled Substance Contract.  I will continue to see patient for regular follow up appointments.  They are well controlled on their medication.  SUZANNE is updated every 3 months. The patient is aware of the potential for addiction and dependence.    Discussed any change in Rx and discussed visit with patient.  All questions answered.      Return in about 3 months (around 9/19/2020).    Santiago \"Osito\" Dianne, HELENA   06/19/20    Dragon disclaimer:   Much of this encounter note is an electronic transcription/translation of spoken language to printed text. The electronic translation of spoken language may permit erroneous, or at times, nonsensical words or phrases to be inadvertently transcribed; Although I have reviewed the note for such errors, some may still exist.     Additional Patient Counseling:       Patient Instructions     Nicoderm    Patch:   21mg for 2 wks  14 mg for 2 wks  7 mg for 4 wks     Wean norco - 10% per week.       "

## 2020-06-19 NOTE — ASSESSMENT & PLAN NOTE
Headaches are unchanged.  Refer to neurology.   ? Emgality   Wean off norco    Refer to ENT for allergies/sinus as this is a significant trigger.

## 2020-06-19 NOTE — PATIENT INSTRUCTIONS
Nicoderm    Patch:   21mg for 2 wks  14 mg for 2 wks  7 mg for 4 wks     Wean norco - 10% per week.

## 2020-06-22 DIAGNOSIS — G44.039 EPISODIC PAROXYSMAL HEMICRANIA, NOT INTRACTABLE: ICD-10-CM

## 2020-06-22 DIAGNOSIS — L73.9 FOLLICULITIS: ICD-10-CM

## 2020-06-22 DIAGNOSIS — G43.001 MIGRAINE WITHOUT AURA AND WITH STATUS MIGRAINOSUS, NOT INTRACTABLE: ICD-10-CM

## 2020-06-22 LAB
CA-I SERPL ISE-MCNC: 6.4 MG/DL (ref 4.5–5.6)
HCV AB S/CO SERPL IA: <0.1 S/CO RATIO (ref 0–0.9)
LABORATORY COMMENT REPORT: NORMAL

## 2020-06-22 NOTE — TELEPHONE ENCOUNTER
Caller: Martha Diaz    Relationship: Self    Best call back number: [phone]    Medication needed:   Requested Prescriptions     Pending Prescriptions Disp Refills   • HYDROcodone-acetaminophen (NORCO) 7.5-325 MG per tablet 120 tablet 0     Sig: TAKE 1 TAB Q 4 TO 6 HRS       When do you need the refill by: ASAP    Does the patient have less than a 3 day supply:  [] Yes  [x] No    What is the patient's preferred pharmacy:    [facility]/pharmacy #[id number] - [address] AT [facility] [phone] [facility] [phone] FX

## 2020-06-23 DIAGNOSIS — G44.039 EPISODIC PAROXYSMAL HEMICRANIA, NOT INTRACTABLE: ICD-10-CM

## 2020-06-23 DIAGNOSIS — G43.001 MIGRAINE WITHOUT AURA AND WITH STATUS MIGRAINOSUS, NOT INTRACTABLE: ICD-10-CM

## 2020-06-23 LAB — DRUGS UR: NORMAL

## 2020-06-23 RX ORDER — HYDROCODONE BITARTRATE AND ACETAMINOPHEN 7.5; 325 MG/1; MG/1
TABLET ORAL
Qty: 120 TABLET | Refills: 0 | Status: SHIPPED | OUTPATIENT
Start: 2020-06-23 | End: 2020-07-13

## 2020-06-23 NOTE — TELEPHONE ENCOUNTER
Last office visit was 6/19/2020, next: 9/21/2020.  Remington on file 5/27/2020, YEYO on file 1/24/2020.

## 2020-06-23 NOTE — TELEPHONE ENCOUNTER
PATIENT CALLED AGAIN IN REGARDS TO MED REFILL    HYDROcodone-acetaminophen (NORCO) 7.5-325 MG per tablet    SHE HAS ONLY 1 DAY LEFT    [facility]/pharmacy #[id number] - [place], [place] - [address] [place] [address] RD[address] AT [facility] - [phone]  - [phone]   [phone]    PATIENT CALL BACK NUMBER [phone]

## 2020-06-24 DIAGNOSIS — E83.52 HYPERCALCEMIA: Primary | ICD-10-CM

## 2020-06-24 RX ORDER — HYDROCODONE BITARTRATE AND ACETAMINOPHEN 7.5; 325 MG/1; MG/1
TABLET ORAL
Qty: 90 TABLET | Refills: 0 | Status: SHIPPED | OUTPATIENT
Start: 2020-06-24 | End: 2020-08-12 | Stop reason: SDUPTHER

## 2020-06-25 ENCOUNTER — RESULTS ENCOUNTER (OUTPATIENT)
Dept: INTERNAL MEDICINE | Facility: CLINIC | Age: 40
End: 2020-06-25

## 2020-06-25 DIAGNOSIS — E83.52 HYPERCALCEMIA: ICD-10-CM

## 2020-06-25 NOTE — PROGRESS NOTES
Please notify patient that her calcium level is still elevated.  I need her to come at her convenience for an additional lab.    She needs an iPTH and vit d - I have placed the order.

## 2020-07-13 LAB
1,25(OH)2D3 SERPL-MCNC: 53.9 PG/ML (ref 19.9–79.3)
PTH-INTACT SERPL-MCNC: 62 PG/ML (ref 15–65)

## 2020-07-20 DIAGNOSIS — G44.039 EPISODIC PAROXYSMAL HEMICRANIA, NOT INTRACTABLE: ICD-10-CM

## 2020-07-20 RX ORDER — PROPRANOLOL HCL 60 MG
CAPSULE, EXTENDED RELEASE 24HR ORAL
Qty: 30 CAPSULE | Refills: 5 | Status: SHIPPED | OUTPATIENT
Start: 2020-07-20

## 2020-08-10 RX ORDER — AZELASTINE HYDROCHLORIDE, FLUTICASONE PROPIONATE 137; 50 UG/1; UG/1
1 SPRAY, METERED NASAL 2 TIMES DAILY
Qty: 1 BOTTLE | Refills: 3 | Status: SHIPPED | OUTPATIENT
Start: 2020-08-10

## 2020-08-12 DIAGNOSIS — G43.001 MIGRAINE WITHOUT AURA AND WITH STATUS MIGRAINOSUS, NOT INTRACTABLE: ICD-10-CM

## 2020-08-12 DIAGNOSIS — G44.039 EPISODIC PAROXYSMAL HEMICRANIA, NOT INTRACTABLE: ICD-10-CM

## 2020-08-12 NOTE — TELEPHONE ENCOUNTER
Caller: Martha Diaz    Relationship: Self    Best call back number: [phone]    Medication needed:   Requested Prescriptions     Pending Prescriptions Disp Refills   • HYDROcodone-acetaminophen (NORCO) 7.5-325 MG per tablet 90 tablet 0     Sig: TAKE 1 TAB Q 4 TO 6 HRS       When do you need the refill by: 8/12/2020    What details did the patient provide when requesting the medication: Has 1.5 day left    Does the patient have less than a 3 day supply:  [x] Yes  [] No    What is the patient's preferred pharmacy: [facility]/PHARMACY #[id number] [address] AT [facility] [phone] [facility] [phone] FX     Patient also had to reschedule appt with neurologist since she started a new job.

## 2020-08-13 ENCOUNTER — DOCUMENTATION (OUTPATIENT)
Dept: INTERNAL MEDICINE | Facility: CLINIC | Age: 40
End: 2020-08-13

## 2020-08-13 RX ORDER — HYDROCODONE BITARTRATE AND ACETAMINOPHEN 7.5; 325 MG/1; MG/1
TABLET ORAL
Qty: 30 TABLET | Refills: 0 | Status: SHIPPED | OUTPATIENT
Start: 2020-08-13

## 2020-08-13 NOTE — TELEPHONE ENCOUNTER
Last ov 6/19/2020, next:9/21/2020.  Remington done 6/18/20. CSA on file 1/24/2020.   Primary recalled again for transportation back to adult home

## 2020-08-13 NOTE — TELEPHONE ENCOUNTER
Patient was prior Dr Bridges patient on norco #180 routinely per month for Migraines / sinus pain.  Since taking over her care, I have referred her to neurology.  She has cancelled and rescheduled several times since referral early in the year.   As she states this was related to sinus also, I have referred her to ENT, Dr Leal.  I called their office today and they have left messages for her to make an appointment but has not over the last 2 months.     She has not made an effort to further evaluate pain.  I have weaned her down to #90 tabs and she still requests every 30 days.  I will decrease to #30 and dc.  [place] is not appropriate treatment for migraines.  She is not compliant with referrals.     I have attempted to reach her by phone, but no answer.  I will send her a [employer] message.   She should not have a problem going to a medical office as she has told me she is working [employer] which puts her in a lot of contact with others.

## 2020-10-21 ENCOUNTER — APPOINTMENT (OUTPATIENT)
Dept: WOMENS IMAGING | Facility: HOSPITAL | Age: 40
End: 2020-10-21

## 2020-10-21 PROCEDURE — 77063 BREAST TOMOSYNTHESIS BI: CPT | Performed by: RADIOLOGY

## 2020-10-21 PROCEDURE — 77067 SCR MAMMO BI INCL CAD: CPT | Performed by: RADIOLOGY

## 2020-11-17 DIAGNOSIS — D50.8 IRON DEFICIENCY ANEMIA SECONDARY TO INADEQUATE DIETARY IRON INTAKE: ICD-10-CM

## 2020-11-19 RX ORDER — DOXYCYCLINE HYCLATE 50 MG/1
CAPSULE, GELATIN COATED ORAL
Qty: 30 TABLET | Refills: 5 | OUTPATIENT
Start: 2020-11-19

## 2020-12-16 DIAGNOSIS — J30.1 SEASONAL ALLERGIC RHINITIS DUE TO POLLEN: ICD-10-CM

## 2020-12-16 RX ORDER — MONTELUKAST SODIUM 10 MG/1
TABLET ORAL
Qty: 30 TABLET | Refills: 1 | Status: SHIPPED | OUTPATIENT
Start: 2020-12-16

## 2021-01-18 ENCOUNTER — TELEPHONE (OUTPATIENT)
Dept: INTERNAL MEDICINE | Facility: CLINIC | Age: 41
End: 2021-01-18

## 2021-01-18 NOTE — TELEPHONE ENCOUNTER
PA approved for Azelastine-Fluticasone nasal spray.  Notification faxed to [facility] pharmacy.

## 2021-01-20 ENCOUNTER — TELEPHONE (OUTPATIENT)
Dept: INTERNAL MEDICINE | Facility: CLINIC | Age: 41
End: 2021-01-20

## 2021-02-16 DIAGNOSIS — G44.039 EPISODIC PAROXYSMAL HEMICRANIA, NOT INTRACTABLE: ICD-10-CM

## 2021-02-16 RX ORDER — PROPRANOLOL HCL 60 MG
CAPSULE, EXTENDED RELEASE 24HR ORAL
Qty: 30 CAPSULE | Refills: 5 | OUTPATIENT
Start: 2021-02-16

## 2021-03-19 DIAGNOSIS — G44.039 EPISODIC PAROXYSMAL HEMICRANIA, NOT INTRACTABLE: ICD-10-CM

## 2021-03-19 RX ORDER — PROPRANOLOL HCL 60 MG
CAPSULE, EXTENDED RELEASE 24HR ORAL
Qty: 30 CAPSULE | Refills: 5 | OUTPATIENT
Start: 2021-03-19

## 2021-04-02 ENCOUNTER — BULK ORDERING (OUTPATIENT)
Dept: CASE MANAGEMENT | Facility: OTHER | Age: 41
End: 2021-04-02

## 2021-04-02 DIAGNOSIS — Z23 IMMUNIZATION DUE: ICD-10-CM

## 2021-04-17 DIAGNOSIS — G44.039 EPISODIC PAROXYSMAL HEMICRANIA, NOT INTRACTABLE: ICD-10-CM

## 2021-04-19 RX ORDER — AZELASTINE HYDROCHLORIDE, FLUTICASONE PROPIONATE 137; 50 UG/1; UG/1
1 SPRAY, METERED NASAL 2 TIMES DAILY
Refills: 3 | OUTPATIENT
Start: 2021-04-19

## 2021-04-19 RX ORDER — PROPRANOLOL HCL 60 MG
CAPSULE, EXTENDED RELEASE 24HR ORAL
Qty: 30 CAPSULE | Refills: 5 | OUTPATIENT
Start: 2021-04-19

## 2021-04-22 DIAGNOSIS — G44.039 EPISODIC PAROXYSMAL HEMICRANIA, NOT INTRACTABLE: ICD-10-CM

## 2021-04-22 RX ORDER — AZELASTINE HYDROCHLORIDE, FLUTICASONE PROPIONATE 137; 50 UG/1; UG/1
1 SPRAY, METERED NASAL 2 TIMES DAILY
Qty: 23 G | Refills: 3 | OUTPATIENT
Start: 2021-04-22

## 2021-04-22 RX ORDER — PROPRANOLOL HCL 60 MG
CAPSULE, EXTENDED RELEASE 24HR ORAL
Qty: 30 CAPSULE | Refills: 5 | OUTPATIENT
Start: 2021-04-22

## 2021-07-23 NOTE — PROGRESS NOTES
Physical Therapy Daily Progress Note    Patient: Martha Diaz   : 1980  Diagnosis/ICD-10 Code:  Chronic pain in right shoulder [M25.511, G89.29]  Referring practitioner: HELENA Farooq  Date of Initial Visit: Type: THERAPY  Noted: 2019  Today's Date: 2020  Patient seen for 15 sessions           Subjective Martha arrived 15 minutes late for treatment.  She had pain in the R lateral neck at 6/10 before treatment.    Objective     Manual therapy:  STM, decompression and sub-occipital release to the cervical spine.    Therapeutic exercise  1. Lying CV flexion x 20  2. Neck rotation, with CV flexion x 10, B  3. Full neck flexion x 10  4. Wand flexion, hooklying, x 10    Patient education: Wand flexion was added to her HEP.  .   Assessment:  She noticed relief of pain with treatment.     Plan:  She is to see the orthopod's office today and a decision about further therapy is to be made.         Timed:    Manual Therapy:    15     mins  32767;  Therapeutic Exercise:    15     mins  10142;     Neuromuscular Elizabeth:        mins  34806;    Therapeutic Activity:          mins  59583;     Gait Training:           mins  71846;     Ultrasound:          mins  26830;    Electrical Stimulation:         mins  22360 ( );  Iontophoresis         mins 07367;  Aquatic Therapy         mins 75758;  Dry Needling                   mins    Untimed:  Electrical Stimulation:         mins  30604 ( );  Mechanical Traction:         mins  62892;     Timed Treatment:   30   mins   Total Treatment:     30   mins  Gold Grubbs PT  Physical Therapist   Opioid Pregnancy And Lactation Text: These medications can lead to premature delivery and should be avoided during pregnancy. These medications are also present in breast milk in small amounts.

## 2022-01-01 NOTE — PROGRESS NOTES
Physical Therapy Daily Progress Note    Patient: Martha Diaz   : 1980  Diagnosis/ICD-10 Code:  Chronic pain in right shoulder [M25.511, G89.29]  Referring practitioner: HELENA Farooq  Date of Initial Visit: Type: THERAPY  Noted: 2019  Today's Date: 2020  Patient seen for 8 sessions           Subjective: Martha reported that her neck was sore from sleeping in an odd postion.    Objective   .   1. Standing shoulder extension, supinated  green theraband, x 12  2. Standing row, supinated , green theraband, x 12  3. Standing punch, 1 lb.,   4. Standing press, 1 lb/   5. ER walk out, yellow theraband, x 12, B  6. Prone scapular retraction with depression,   7. Lying craniovertebral (CV) flexion, x 20  8. Neck rotation, with CV flexion, x 10, B    Manual therapy:  In supine, with bolster under legs, cervical spine STM, decompression and sub-occipital release.    Patient education:  I asked her to use a towel in her pillow to support the lordosis of her neck in sleep and sent a video of the [employer] cervical roll for instruction.    Assessment:  Martha required cues for technique of exercise. She is responding well to treatment thus far.    Plan:  Monitor the effect of today's session and consider progress neck flexion strengthening.      Timed:    Manual Therapy:    10     mins  02236;  Therapeutic Exercise:    20     mins  37118;     Neuromuscular Elizabeth:        mins  54782;    Therapeutic Activity:          mins  66756;     Gait Training:           mins  16499;     Ultrasound:          mins  81643;    Electrical Stimulation:         mins  06866 ( );  Iontophoresis         mins 65199;  Aquatic Therapy         mins 60786;    Untimed:  Electrical Stimulation:         mins  28115 ( );  Mechanical Traction:         mins  19041;     Timed Treatment:   30   mins   Total Treatment:     30   mins  Gold Grubbs PT  Physical Therapist  
Home

## 2022-10-11 NOTE — TELEPHONE ENCOUNTER
Next OV 06/27/2019  Remington 03/21/2019  Contract 12/06/2018   Picato Counseling:  I discussed with the patient the risks of Picato including but not limited to erythema, scaling, itching, weeping, crusting, and pain.

## 2025-04-11 ENCOUNTER — APPOINTMENT (OUTPATIENT)
Dept: WOMENS IMAGING | Facility: HOSPITAL | Age: 45
End: 2025-04-11
Payer: COMMERCIAL

## 2025-04-11 PROCEDURE — G0279 TOMOSYNTHESIS, MAMMO: HCPCS | Performed by: RADIOLOGY

## 2025-04-11 PROCEDURE — 77065 DX MAMMO INCL CAD UNI: CPT | Performed by: RADIOLOGY

## 2025-04-11 PROCEDURE — 77061 BREAST TOMOSYNTHESIS UNI: CPT | Performed by: RADIOLOGY

## 2025-04-11 PROCEDURE — 76642 ULTRASOUND BREAST LIMITED: CPT | Performed by: RADIOLOGY
